# Patient Record
Sex: FEMALE | Race: WHITE | NOT HISPANIC OR LATINO | Employment: OTHER | URBAN - METROPOLITAN AREA
[De-identification: names, ages, dates, MRNs, and addresses within clinical notes are randomized per-mention and may not be internally consistent; named-entity substitution may affect disease eponyms.]

---

## 2020-06-19 ENCOUNTER — HOSPITAL ENCOUNTER (EMERGENCY)
Facility: CLINIC | Age: 80
Discharge: HOME | End: 2020-06-19
Attending: EMERGENCY MEDICINE | Admitting: EMERGENCY MEDICINE
Payer: COMMERCIAL

## 2020-06-19 VITALS
RESPIRATION RATE: 18 BRPM | SYSTOLIC BLOOD PRESSURE: 152 MMHG | BODY MASS INDEX: 27.49 KG/M2 | TEMPERATURE: 98.8 F | DIASTOLIC BLOOD PRESSURE: 105 MMHG | WEIGHT: 165 LBS | HEART RATE: 68 BPM | HEIGHT: 65 IN | OXYGEN SATURATION: 98 %

## 2020-06-19 DIAGNOSIS — L03.211 FACIAL CELLULITIS: ICD-10-CM

## 2020-06-19 DIAGNOSIS — L01.00 IMPETIGO: ICD-10-CM

## 2020-06-19 PROCEDURE — 25000132 ZZH RX MED GY IP 250 OP 250 PS 637: Performed by: EMERGENCY MEDICINE

## 2020-06-19 PROCEDURE — 99283 EMERGENCY DEPT VISIT LOW MDM: CPT | Performed by: EMERGENCY MEDICINE

## 2020-06-19 PROCEDURE — 99284 EMERGENCY DEPT VISIT MOD MDM: CPT | Mod: Z6 | Performed by: EMERGENCY MEDICINE

## 2020-06-19 RX ORDER — CEPHALEXIN 500 MG/1
500 CAPSULE ORAL 4 TIMES DAILY
Qty: 28 CAPSULE | Refills: 0 | Status: SHIPPED | OUTPATIENT
Start: 2020-06-19 | End: 2020-06-26

## 2020-06-19 RX ORDER — CEPHALEXIN 500 MG/1
CAPSULE ORAL
Status: DISPENSED
Start: 2020-06-19 | End: 2020-06-19

## 2020-06-19 RX ORDER — CEPHALEXIN 500 MG/1
500 CAPSULE ORAL ONCE
Status: COMPLETED | OUTPATIENT
Start: 2020-06-19 | End: 2020-06-19

## 2020-06-19 RX ADMIN — CEPHALEXIN 500 MG: 500 CAPSULE ORAL at 01:35

## 2020-06-19 ASSESSMENT — ENCOUNTER SYMPTOMS
CONFUSION: 0
SHORTNESS OF BREATH: 0
FACIAL SWELLING: 1
VOICE CHANGE: 0
CHEST TIGHTNESS: 0
ABDOMINAL PAIN: 0
HEADACHES: 0
COLOR CHANGE: 1
SORE THROAT: 0
FEVER: 0
NECK PAIN: 0
TROUBLE SWALLOWING: 0
NAUSEA: 0
FATIGUE: 0
BACK PAIN: 0
APPETITE CHANGE: 0

## 2020-06-19 ASSESSMENT — MIFFLIN-ST. JEOR: SCORE: 1224.32

## 2020-06-19 NOTE — ED PROVIDER NOTES
History     Chief Complaint   Patient presents with     Allergic Reaction     HPI  Claudene Lundberg is a 80 year old female with history of chronic kidney disease, hypercholesterolemia, and hypertension presenting for evaluation of facial swelling.  She reports some mild swelling of her upper lip over the past 2 days.  Patient does report having a small pimple on her left upper lip which seem to be draining a small amount of fluid as well.  She reports some slight increasing pain in the upper lip and has been applying warm compresses to help.  She does report swelling seem to be getting better yesterday but this morning woke up with sensation of increasing swelling and pain so came in for evaluation.  Denies fever chills.  Denies swelling in her mouth.  Denies difficulty swallowing or breathing.  No reported trauma however she does report she occasionally will shave small hairs that she gets on her upper lip and does report recently nicking her skin with the blade causing a small amount of bleeding.  No recent changes in medications.  No known allergies.  No previous similar symptoms in the past.    Allergies:  No Known Allergies    Problem List:    Patient Active Problem List    Diagnosis Date Noted     Chronic kidney disease, stage III (moderate) (H) 11/11/2009     Priority: Medium     Formatting of this note might be different from the original.  11/08 Creatinine 1.3  CREATININE                (MG/DL)   Date Value   11/11/09  1:50 PM 1.5*       Pure hypercholesterolemia 03/10/2008     Priority: Medium     3/08 Cholesterol 225 HDL 57  TG 90  TSH 1.26       Essential hypertension 03/07/2008     Priority: Medium        Past Medical History:    No past medical history on file.    Past Surgical History:    No past surgical history on file.    Family History:    No family history on file.    Social History:  Marital Status:   [2]  Social History     Tobacco Use     Smoking status: Not on file  "  Substance Use Topics     Alcohol use: Not on file     Drug use: Not on file        Medications:    cephALEXin (KEFLEX) 500 MG capsule          Review of Systems   Constitutional: Negative for appetite change, fatigue and fever.   HENT: Positive for facial swelling. Negative for congestion, sore throat, trouble swallowing and voice change.    Respiratory: Negative for chest tightness and shortness of breath.    Cardiovascular: Negative for chest pain.   Gastrointestinal: Negative for abdominal pain and nausea.   Musculoskeletal: Negative for back pain and neck pain.   Skin: Positive for color change (redness upper lip).   Neurological: Negative for headaches.   Psychiatric/Behavioral: Negative for confusion.   All other systems reviewed and are negative.      Physical Exam   Pulse: 76  Temp: 99.2  F (37.3  C)  Resp: 18  Height: 165.1 cm (5' 5\")  Weight: 74.8 kg (165 lb)  SpO2: 95 %      Physical Exam  Vitals signs and nursing note reviewed.   Constitutional:       Appearance: Normal appearance. She is not ill-appearing or diaphoretic.   HENT:      Head: Atraumatic.        Nose: Nose normal.      Mouth/Throat:      Lips: Pink.      Mouth: Mucous membranes are moist. No angioedema.      Dentition: Does not have dentures (Dentures not present). No dental abscesses.      Pharynx: No uvula swelling.      Comments: Moderate swelling and induration of the upper lip with asymmetry most prominent on the left upper lip, see photo below  Eyes:      Conjunctiva/sclera: Conjunctivae normal.   Neck:      Musculoskeletal: Normal range of motion and neck supple.   Cardiovascular:      Rate and Rhythm: Normal rate.   Pulmonary:      Effort: Pulmonary effort is normal.   Abdominal:      General: Abdomen is flat.   Musculoskeletal: Normal range of motion.   Skin:     General: Skin is warm and dry.      Capillary Refill: Capillary refill takes less than 2 seconds.   Neurological:      Mental Status: She is alert and oriented to " person, place, and time.   Psychiatric:         Mood and Affect: Mood normal.             ED Course        Procedures                   No results found for this or any previous visit (from the past 24 hour(s)).    Medications   cephALEXin (KEFLEX) capsule 500 mg (has no administration in time range)       Assessments & Plan (with Medical Decision Making)  80-year-old female presenting for evaluation of swelling of her left upper lip.  Symptoms began a few days ago when she noticed a small pimple on her left upper lip.  She is been applying warm compresses and had some drainage from the pimple.  Tonight woke up in the middle of the night with sensation of increasing swelling of the lip and when she looked in the mirror noticed more swelling so came in for evaluation.  On exam she has a mildly erythematous and warm indurated upper lip more prominent on the left than the right with an associated small lesion suggestive of impetigo.  The indurated area appears consistent with localized cellulitis.  No palpable fluctuance to suggest an abscess.  No other systemic symptoms to suggest an allergic reaction.  Recommended starting antibiotics for presumed impetigo with underlying facial cellulitis.  Prescribed cephalexin.  Recommended close monitoring for worsening with return precautions if any symptoms appear to get worse.     I have reviewed the nursing notes.    I have reviewed the findings, diagnosis, plan and need for follow up with the patient.       New Prescriptions    CEPHALEXIN (KEFLEX) 500 MG CAPSULE    Take 1 capsule (500 mg) by mouth 4 times daily for 7 days       Final diagnoses:   Facial cellulitis   Impetigo       6/19/2020   [facility] EMERGENCY DEPARTMENT     Benavides, Emmanuel Nolasco MD  06/19/20 0144

## 2020-06-19 NOTE — DISCHARGE INSTRUCTIONS
If your symptoms appear to be getting worse, please return to the emergency department for a recheck of your face

## 2020-06-19 NOTE — ED TRIAGE NOTES
Pt has had facial swelling since late weds and was getting better and now its worse again. Takes Lisinopril, atenolol and lasix no new medications.

## 2020-06-19 NOTE — ED AVS SNAPSHOT
[facility] Emergency Department  [address] [address]  Phone:  [phone]  Fax:  [phone]                                    Claudene Lundberg   MRN: [id number]    Department:  [facility] Emergency Department   Date of Visit:  6/19/2020           After Visit Summary Signature Page    I have received my discharge instructions, and my questions have been answered. I have discussed any challenges I see with this plan with the nurse or doctor.    ..........................................................................................................................................  Patient/Patient Representative Signature      ..........................................................................................................................................  Patient Representative Print Name and Relationship to Patient    ..................................................               ................................................  Date                                   Time    ..........................................................................................................................................  Reviewed by Signature/Title    ...................................................              ..............................................  Date                                               Time          22EPIC Rev 08/18

## 2021-06-02 ENCOUNTER — HOSPITAL ENCOUNTER (EMERGENCY)
Facility: CLINIC | Age: 81
Discharge: HOME | End: 2021-06-02
Attending: PHYSICIAN ASSISTANT | Admitting: PHYSICIAN ASSISTANT
Payer: COMMERCIAL

## 2021-06-02 VITALS
RESPIRATION RATE: 16 BRPM | WEIGHT: 160 LBS | DIASTOLIC BLOOD PRESSURE: 96 MMHG | SYSTOLIC BLOOD PRESSURE: 148 MMHG | OXYGEN SATURATION: 94 % | BODY MASS INDEX: 25.71 KG/M2 | HEIGHT: 66 IN | TEMPERATURE: 98.1 F | HEART RATE: 71 BPM
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
## 2021-06-02 DIAGNOSIS — R01.1 UNDIAGNOSED CARDIAC MURMURS: ICD-10-CM

## 2021-06-02 PROCEDURE — 99214 OFFICE O/P EST MOD 30 MIN: CPT | Performed by: PHYSICIAN ASSISTANT

## 2021-06-02 PROCEDURE — G0463 HOSPITAL OUTPT CLINIC VISIT: HCPCS | Performed by: PHYSICIAN ASSISTANT

## 2021-06-02 RX ORDER — DOXYCYCLINE HYCLATE 100 MG
100 TABLET ORAL 2 TIMES DAILY
Qty: 28 TABLET | Refills: 0 | Status: SHIPPED | OUTPATIENT
Start: 2021-06-02 | End: 2021-06-16

## 2021-06-02 RX ORDER — ATENOLOL 50 MG/1
50 TABLET ORAL DAILY
COMMUNITY
Start: 2023-02-21

## 2021-06-02 RX ORDER — LISINOPRIL 40 MG/1
40 TABLET ORAL DAILY
COMMUNITY
Start: 2021-04-14

## 2021-06-02 RX ORDER — AMLODIPINE BESYLATE 5 MG/1
5 TABLET ORAL
COMMUNITY
Start: 2021-01-20 | End: 2023-04-11

## 2021-06-02 RX ORDER — FUROSEMIDE 20 MG
30 TABLET ORAL DAILY
COMMUNITY
Start: 2021-03-18

## 2021-06-02 ASSESSMENT — ENCOUNTER SYMPTOMS
WOUND: 1
CARDIOVASCULAR NEGATIVE: 1
RESPIRATORY NEGATIVE: 1
NEUROLOGICAL NEGATIVE: 1
CONSTITUTIONAL NEGATIVE: 1
COLOR CHANGE: 0

## 2021-06-02 ASSESSMENT — MIFFLIN-ST. JEOR: SCORE: 1212.51

## 2021-06-03 NOTE — ED PROVIDER NOTES
History     Chief Complaint   Patient presents with     Insect Bite     c/o tick bite     HPI  Claudene Y Lundberg is a 81 year old female  with a past medical history of chronic kidney disease stage III, essential hypertension, and hypercholesterolemia who is presenting with a tick bite on her back, discovered this afternoon.  States that she discovered the tick this afternoon while taking a bath.  She has not been out in the yard or walking on the woods at any point time recently.  She brought the tick in a small pill bottle today.  The body of the insect looks white/engorged, but she does not know how long the tick was attached.  She feels as though a small portion head might still be stuck in her skin.  Has some redness surrounding the bite site.  She denies any current symptoms of Lyme disease such as fever, chills, nausea/vomiting, lightheadedness or dizziness, body aches, joint swelling or joint pain, or palpitations, chest pain or shortness of breath.    Allergies:  No Known Allergies    Problem List:    Patient Active Problem List    Diagnosis Date Noted     Chronic kidney disease, stage III (moderate) 11/11/2009     Priority: Medium     Formatting of this note might be different from the original.  11/08 Creatinine 1.3  CREATININE                (MG/DL)   Date Value   11/11/09  1:50 PM 1.5*       Pure hypercholesterolemia 03/10/2008     Priority: Medium     3/08 Cholesterol 225 HDL 57  TG 90  TSH 1.26       Essential hypertension 03/07/2008     Priority: Medium        Past Medical History:    History reviewed. No pertinent past medical history.    Past Surgical History:    History reviewed. No pertinent surgical history.    Family History:    History reviewed. No pertinent family history.    Social History:  Marital Status:   [2]  Social History     Tobacco Use     Smoking status: Never Smoker     Smokeless tobacco: Never Used   Substance Use Topics     Alcohol use: None     Drug use: None  "       Medications:    amLODIPine (NORVASC) 5 MG tablet  atenolol (TENORMIN) 50 MG tablet  doxycycline hyclate (VIBRA-TABS) 100 MG tablet  furosemide (LASIX) 20 MG tablet  lisinopril (ZESTRIL) 40 MG tablet          Review of Systems   Constitutional: Negative.    HENT: Negative.    Respiratory: Negative.    Cardiovascular: Negative.    Genitourinary: Negative.    Skin: Positive for rash and wound. Negative for color change and pallor.   Neurological: Negative.        Physical Exam   BP: (!) 185/95  Pulse: 71  Temp: 98.1  F (36.7  C)  Resp: 16  Height: 167.6 cm (5' 6\")  Weight: 72.6 kg (160 lb)  SpO2: 94 %      Physical Exam  Constitutional:       General: She is not in acute distress.     Appearance: Normal appearance. She is not ill-appearing.   HENT:      Head: Normocephalic and atraumatic.      Right Ear: No middle ear effusion. No mastoid tenderness. Tympanic membrane is not injected, perforated, erythematous, retracted or bulging.      Left Ear:  No middle ear effusion. No mastoid tenderness. Tympanic membrane is not injected, perforated, erythematous, retracted or bulging.   Eyes:      General:         Right eye: No discharge.         Left eye: No discharge.      Extraocular Movements: Extraocular movements intact.      Conjunctiva/sclera: Conjunctivae normal.   Neck:      Musculoskeletal: Neck supple. No neck rigidity or muscular tenderness.   Cardiovascular:      Rate and Rhythm: Normal rate and regular rhythm.      Pulses: Normal pulses.      Heart sounds: S1 normal and S2 normal. Heart sounds not distant. Murmur present. Crescendo  decrescendo  systolic murmur present with a grade of 2/6. No friction rub.      Comments: 2/6 systolic murmur noted at the right sternal border, 2nd intercostal space, pattern was crescendo followed by crescendo  Pulmonary:      Effort: Pulmonary effort is normal. No respiratory distress.      Breath sounds: Normal breath sounds. No stridor.   Lymphadenopathy:      Cervical: No " cervical adenopathy.      Right cervical: No superficial, deep or posterior cervical adenopathy.     Left cervical: No superficial, deep or posterior cervical adenopathy.   Skin:     General: Skin is warm and dry.      Findings: Lesion present.          Neurological:      General: No focal deficit present.      Mental Status: She is alert and oriented to person, place, and time.      Sensory: No sensory deficit.   Psychiatric:         Mood and Affect: Mood normal.         Behavior: Behavior normal.         Thought Content: Thought content normal.         Judgment: Judgment normal.         ED Course        Procedures               No results found for this or any previous visit (from the past 24 hour(s)).    Medications - No data to display    Assessments & Plan (with Medical Decision Making)     The patient is an 81-year-old female presented to urgent care with concern for tick bite on her low back, left side discovered this afternoon.  She brought the insect with her, which was engorged, body was white in appearance.  On inspection of the wound, it appeared there was a small retained portion of the tick head.    I cleansed the area with an alcohol prep pad and used a tweezers to remove the  retained foreign body.  Following removal, I am cleansed the area with povidone iodine, applied topical bacitracin and a Band-Aid.  Recommended applying bacitracin once per day for the next 5 to 7 days.    Given the engorged appearance of the tick, I am concerned about an extended attachment of 2 to 3 days.  I am starting the patient on a 2-week course of doxycycline given the likelihood of 3 days of attachment and a small amount of erythema at the site of the bite.  I feel this is reasonable given the patient's age even though she lacks any of the other symptoms of Lyme disease at this time.     Recommended follow-up with her primary care provider in 1 week for further evaluation of heart murmur as well as monitoring for  symptoms of Lyme disease.    Return to clinic for further evaluation if you develop fever, lightheadedness or dizziness, nausea/vomiting, body aches or joint pain, chest pain, difficulty breathing, shortness of breath, difficulty swallowing, or severe abdominal pain.      I have reviewed the nursing notes.    I have reviewed the findings, diagnosis, plan and need for follow up with the patient.      New Prescriptions    DOXYCYCLINE HYCLATE (VIBRA-TABS) 100 MG TABLET    Take 1 tablet (100 mg) by mouth 2 times daily for 14 days       Final diagnoses:   Tick bite   Undiagnosed cardiac murmurs       6/2/2021   [facility] EMERGENCY DEPT     Edgar Long PA-C  06/02/21 [id number]

## 2022-10-12 ENCOUNTER — APPOINTMENT (OUTPATIENT)
Dept: GENERAL RADIOLOGY | Facility: CLINIC | Age: 82
End: 2022-10-12
Attending: EMERGENCY MEDICINE
Payer: COMMERCIAL

## 2022-10-12 ENCOUNTER — APPOINTMENT (OUTPATIENT)
Dept: MRI IMAGING | Facility: CLINIC | Age: 82
End: 2022-10-12
Attending: EMERGENCY MEDICINE
Payer: COMMERCIAL

## 2022-10-12 ENCOUNTER — HOSPITAL ENCOUNTER (EMERGENCY)
Facility: CLINIC | Age: 82
Discharge: HOME | End: 2022-10-12
Attending: EMERGENCY MEDICINE | Admitting: EMERGENCY MEDICINE
Payer: COMMERCIAL

## 2022-10-12 VITALS
SYSTOLIC BLOOD PRESSURE: 153 MMHG | DIASTOLIC BLOOD PRESSURE: 105 MMHG | BODY MASS INDEX: 25.82 KG/M2 | RESPIRATION RATE: 18 BRPM | HEART RATE: 72 BPM | TEMPERATURE: 98.1 F | WEIGHT: 160 LBS

## 2022-10-12 DIAGNOSIS — M19.079 ARTHRITIS OF FIRST METATARSOPHALANGEAL (MTP) JOINT: ICD-10-CM

## 2022-10-12 DIAGNOSIS — M79.675 GREAT TOE PAIN, LEFT: ICD-10-CM

## 2022-10-12 LAB
ANION GAP SERPL CALCULATED.3IONS-SCNC: 11 MMOL/L (ref 7–15)
BASOPHILS # BLD MANUAL: 0 10E3/UL (ref 0–0.2)
BASOPHILS NFR BLD MANUAL: 0 %
BUN SERPL-MCNC: 20.5 MG/DL (ref 8–23)
CALCIUM SERPL-MCNC: 9.9 MG/DL (ref 8.8–10.2)
CHLORIDE SERPL-SCNC: 103 MMOL/L (ref 98–107)
CREAT SERPL-MCNC: 0.77 MG/DL (ref 0.51–0.95)
CRP SERPL-MCNC: 67.34 MG/L
DEPRECATED HCO3 PLAS-SCNC: 25 MMOL/L (ref 22–29)
EOSINOPHIL # BLD MANUAL: 0.1 10E3/UL (ref 0–0.7)
EOSINOPHIL NFR BLD MANUAL: 2 %
ERYTHROCYTE [DISTWIDTH] IN BLOOD BY AUTOMATED COUNT: 13.3 % (ref 10–15)
ERYTHROCYTE [SEDIMENTATION RATE] IN BLOOD BY WESTERGREN METHOD: 38 MM/HR (ref 0–30)
GFR SERPL CREATININE-BSD FRML MDRD: 77 ML/MIN/1.73M2
GLUCOSE SERPL-MCNC: 118 MG/DL (ref 70–99)
HCT VFR BLD AUTO: 40.6 % (ref 35–47)
HGB BLD-MCNC: 13.1 G/DL (ref 11.7–15.7)
LYMPHOCYTES # BLD MANUAL: 0.7 10E3/UL (ref 0.8–5.3)
LYMPHOCYTES NFR BLD MANUAL: 11 %
MCH RBC QN AUTO: 30.4 PG (ref 26.5–33)
MCHC RBC AUTO-ENTMCNC: 32.3 G/DL (ref 31.5–36.5)
MCV RBC AUTO: 94 FL (ref 78–100)
MONOCYTES # BLD MANUAL: 1.2 10E3/UL (ref 0–1.3)
MONOCYTES NFR BLD MANUAL: 19 %
NEUTROPHILS # BLD MANUAL: 4.4 10E3/UL (ref 1.6–8.3)
NEUTROPHILS NFR BLD MANUAL: 68 %
PLAT MORPH BLD: ABNORMAL
PLATELET # BLD AUTO: 207 10E3/UL (ref 150–450)
POTASSIUM SERPL-SCNC: 4.5 MMOL/L (ref 3.4–5.3)
RBC # BLD AUTO: 4.31 10E6/UL (ref 3.8–5.2)
RBC MORPH BLD: ABNORMAL
SODIUM SERPL-SCNC: 139 MMOL/L (ref 136–145)
URATE SERPL-MCNC: 6.5 MG/DL (ref 2.4–5.7)
WBC # BLD AUTO: 6.4 10E3/UL (ref 4–11)

## 2022-10-12 PROCEDURE — 99285 EMERGENCY DEPT VISIT HI MDM: CPT | Mod: 25 | Performed by: EMERGENCY MEDICINE

## 2022-10-12 PROCEDURE — 84550 ASSAY OF BLOOD/URIC ACID: CPT | Performed by: EMERGENCY MEDICINE

## 2022-10-12 PROCEDURE — 20604 DRAIN/INJ JOINT/BURSA W/US: CPT

## 2022-10-12 PROCEDURE — 20604 DRAIN/INJ JOINT/BURSA W/US: CPT | Performed by: EMERGENCY MEDICINE

## 2022-10-12 PROCEDURE — 80048 BASIC METABOLIC PNL TOTAL CA: CPT | Performed by: EMERGENCY MEDICINE

## 2022-10-12 PROCEDURE — 73720 MRI LWR EXTREMITY W/O&W/DYE: CPT | Mod: 26 | Performed by: RADIOLOGY

## 2022-10-12 PROCEDURE — 85027 COMPLETE CBC AUTOMATED: CPT | Performed by: EMERGENCY MEDICINE

## 2022-10-12 PROCEDURE — 255N000002 HC RX 255 OP 636: Performed by: EMERGENCY MEDICINE

## 2022-10-12 PROCEDURE — 85007 BL SMEAR W/DIFF WBC COUNT: CPT | Performed by: EMERGENCY MEDICINE

## 2022-10-12 PROCEDURE — 29515 APPLICATION SHORT LEG SPLINT: CPT | Mod: LT | Performed by: EMERGENCY MEDICINE

## 2022-10-12 PROCEDURE — 73720 MRI LWR EXTREMITY W/O&W/DYE: CPT | Mod: LT

## 2022-10-12 PROCEDURE — A9585 GADOBUTROL INJECTION: HCPCS | Performed by: EMERGENCY MEDICINE

## 2022-10-12 PROCEDURE — 85652 RBC SED RATE AUTOMATED: CPT | Performed by: EMERGENCY MEDICINE

## 2022-10-12 PROCEDURE — 36415 COLL VENOUS BLD VENIPUNCTURE: CPT | Performed by: EMERGENCY MEDICINE

## 2022-10-12 PROCEDURE — 73660 X-RAY EXAM OF TOE(S): CPT | Mod: LT

## 2022-10-12 PROCEDURE — 250N000013 HC RX MED GY IP 250 OP 250 PS 637: Performed by: EMERGENCY MEDICINE

## 2022-10-12 PROCEDURE — 86140 C-REACTIVE PROTEIN: CPT | Performed by: EMERGENCY MEDICINE

## 2022-10-12 RX ORDER — LORAZEPAM 0.5 MG/1
0.5 TABLET ORAL ONCE
Status: DISCONTINUED | OUTPATIENT
Start: 2022-10-12 | End: 2022-10-12 | Stop reason: HOSPADM

## 2022-10-12 RX ORDER — METHYLPREDNISOLONE 4 MG
TABLET, DOSE PACK ORAL
Qty: 21 TABLET | Refills: 0 | Status: SHIPPED | OUTPATIENT
Start: 2022-10-12 | End: 2023-04-11

## 2022-10-12 RX ORDER — OXYCODONE HYDROCHLORIDE 5 MG/1
5 TABLET ORAL ONCE
Status: COMPLETED | OUTPATIENT
Start: 2022-10-12 | End: 2022-10-12

## 2022-10-12 RX ORDER — GADOBUTROL 604.72 MG/ML
7 INJECTION INTRAVENOUS ONCE
Status: COMPLETED | OUTPATIENT
Start: 2022-10-12 | End: 2022-10-12

## 2022-10-12 RX ADMIN — OXYCODONE HYDROCHLORIDE 5 MG: 5 TABLET ORAL at 03:26

## 2022-10-12 RX ADMIN — GADOBUTROL 7 ML: 604.72 INJECTION INTRAVENOUS at 09:48

## 2022-10-12 ASSESSMENT — ACTIVITIES OF DAILY LIVING (ADL)
ADLS_ACUITY_SCORE: 35

## 2022-10-12 ASSESSMENT — ENCOUNTER SYMPTOMS
VOMITING: 0
FEVER: 0
JOINT SWELLING: 1
WEAKNESS: 0

## 2022-10-12 NOTE — ED PROVIDER NOTES
History     Chief Complaint   Patient presents with     Foot Pain     HPI  Claudene Y Lundberg is a 82 year old female who presents for left foot pain.  Symptoms getting worse over the past 2 or 3 days.  No known injury.  Pain is throbbing and aching, hurts to move the toe and to put weight on the foot.  She is tried acetaminophen without improvement.  No fevers, chills, nausea, vomiting, body aches, abdominal pain.  She has never had this before.    Allergies:  No Known Allergies    Problem List:    Patient Active Problem List    Diagnosis Date Noted     Chronic kidney disease, stage III (moderate) (H) 11/11/2009     Priority: Medium     Formatting of this note might be different from the original.  11/08 Creatinine 1.3  CREATININE                (MG/DL)   Date Value   11/11/09  1:50 PM 1.5*       Pure hypercholesterolemia 03/10/2008     Priority: Medium     3/08 Cholesterol 225 HDL 57  TG 90  TSH 1.26       Essential hypertension 03/07/2008     Priority: Medium        Past Medical History:    No past medical history on file.    Past Surgical History:    No past surgical history on file.    Family History:    No family history on file.    Social History:  Marital Status:   [2]  Social History     Tobacco Use     Smoking status: Never     Smokeless tobacco: Never        Medications:    amLODIPine (NORVASC) 5 MG tablet  atenolol (TENORMIN) 50 MG tablet  furosemide (LASIX) 20 MG tablet  lisinopril (ZESTRIL) 40 MG tablet          Review of Systems   Constitutional: Negative for fever.   Gastrointestinal: Negative for vomiting.   Musculoskeletal: Positive for joint swelling (left great toe).   Neurological: Negative for weakness.       Physical Exam   BP: (!) 166/97  Pulse: 69  Temp: 98.1  F (36.7  C)  Resp: 18  Weight: 72.6 kg (160 lb)      Physical Exam  Constitutional:       General: She is not in acute distress.     Appearance: She is well-developed and well-nourished. She is not diaphoretic.   HENT:       Head: Normocephalic and atraumatic.   Eyes:      General: No scleral icterus.  Musculoskeletal:      Cervical back: Normal range of motion and neck supple.      Comments: Left foot: Erythema, excess warmth, swelling, tenderness over the top of the left foot maximally over the MTP joint of the great toe.  Pain elicited with movement of the MTP joint of the great toe.   Skin:     General: Skin is warm and dry.      Coloration: Skin is not pale.   Neurological:      Mental Status: She is alert and oriented to person, place, and time.         ED Course                 Procedures    Results for orders placed during the hospital encounter of 10/12/22    POC US GUIDANCE NEEDLE PLACEMENT    Impression  [facility] Procedure Note    Limited Bedside ED Ultrasound for Procedural Guidance:    Procedure: Arthrocentesis  PROCEDURE: PERFORMED BY: Dr. Juan Lala MD  INDICATIONS/SYMPTOM:  Swelling in joint  PROBE: High frequency linear probe  BODY LOCATION: Left great toe  FINDINGS:  Normal landmarks were identified such that vessels (arteries, veins) and nerves were avoided.  INTERPRETATION:  Stuctures were visualized but aspiration was not successful.  IMAGE DOCUMENTATION: Images were archived to PACs system.            Critical Care time:  none               Results for orders placed or performed during the hospital encounter of 10/12/22 (from the past 24 hour(s))   POC US GUIDANCE NEEDLE PLACEMENT    Impression    [facility] Procedure Note      Limited Bedside ED Ultrasound for Procedural Guidance:    Procedure: Arthrocentesis  PROCEDURE: PERFORMED BY: Dr. Juan Lala MD  INDICATIONS/SYMPTOM:  Swelling in joint  PROBE: High frequency linear probe  BODY LOCATION: Left great toe  FINDINGS:  Normal landmarks were identified such that vessels (arteries, veins) and nerves were avoided.  INTERPRETATION:  Stuctures were visualized but aspiration was not successful.  IMAGE DOCUMENTATION: Images  were archived to PACs system.    XR Toe Left G/E 2 Views    Narrative    EXAM: XR TOE LEFT G/E 2 VIEWS  LOCATION: [facility]  DATE/TIME: 10/12/2022 4:05 AM    INDICATION: pain and swelling over mtp joint  COMPARISON: None.      Impression    IMPRESSION: Lucency and possible cortical irregularity about the distal first left metatarsal, which could be seen with osteomyelitis in the right clinical context. There is adjacent soft tissue swelling. If clinical concern for osteomyelitis, could   consider MRI for further evaluation. There is some degenerative change also noted at the 1st metatarsal phalangeal joint.    Basic metabolic panel   Result Value Ref Range    Sodium 139 136 - 145 mmol/L    Potassium 4.5 3.4 - 5.3 mmol/L    Chloride 103 98 - 107 mmol/L    Carbon Dioxide (CO2) 25 22 - 29 mmol/L    Anion Gap 11 7 - 15 mmol/L    Urea Nitrogen 20.5 8.0 - 23.0 mg/dL    Creatinine 0.77 0.51 - 0.95 mg/dL    Calcium 9.9 8.8 - 10.2 mg/dL    Glucose 118 (H) 70 - 99 mg/dL    GFR Estimate 77 >60 mL/min/1.73m2   CBC with Platelets & Differential    Narrative    The following orders were created for panel order CBC with Platelets & Differential.  Procedure                               Abnormality         Status                     ---------                               -----------         ------                     CBC with platelets and d...[[id number]]  Normal              Final result               Manual Differential[[id number]]          Abnormal            Final result                 Please view results for these tests on the individual orders.   CRP inflammation   Result Value Ref Range    CRP Inflammation 67.34 (H) <5.00 mg/L   Erythrocyte sedimentation rate auto   Result Value Ref Range    Erythrocyte Sedimentation Rate 38 (H) 0 - 30 mm/hr   CBC with platelets and differential   Result Value Ref Range    WBC Count 6.4 4.0 - 11.0 10e3/uL    RBC Count 4.31 3.80 - 5.20 10e6/uL    Hemoglobin 13.1  11.7 - 15.7 g/dL    Hematocrit 40.6 35.0 - 47.0 %    MCV 94 78 - 100 fL    MCH 30.4 26.5 - 33.0 pg    MCHC 32.3 31.5 - 36.5 g/dL    RDW 13.3 10.0 - 15.0 %    Platelet Count 207 150 - 450 10e3/uL   Manual Differential   Result Value Ref Range    % Neutrophils 68 %    % Lymphocytes 11 %    % Monocytes 19 %    % Eosinophils 2 %    % Basophils 0 %    Absolute Neutrophils 4.4 1.6 - 8.3 10e3/uL    Absolute Lymphocytes 0.7 (L) 0.8 - 5.3 10e3/uL    Absolute Monocytes 1.2 0.0 - 1.3 10e3/uL    Absolute Eosinophils 0.1 0.0 - 0.7 10e3/uL    Absolute Basophils 0.0 0.0 - 0.2 10e3/uL    RBC Morphology Confirmed RBC Indices     Platelet Assessment  Automated Count Confirmed. Platelet morphology is normal.     Automated Count Confirmed. Platelet morphology is normal.       Medications   LORazepam (ATIVAN) tablet 0.5 mg (has no administration in time range)   oxyCODONE (ROXICODONE) tablet 5 mg (5 mg Oral Given 10/12/22 0326)       Assessments & Plan (with Medical Decision Making)   82-year-old female presents with pain and swelling of the left foot.  Temperature is 36.7  C, heart rate is 67, blood pressure is 145/84.  No signs of systemic infection.  She is given oxycodone for the pain.  I attempted aspiration but this was unfortunately not successful.  X-ray of the foot is obtained, images reviewed independently as well as radiology read reviewed, no fracture but they do note some lucency that is concerning for possible osteomyelitis.  I think this is low risk, however with the patient's pain, erythema, tenderness, excess warmth, it certainly is a possibility.  Therefore blood is drawn and she does have elevations of her CRP and ESR but a normal white blood cell count.  Given the clinical uncertainty I will obtain an MRI of her foot to look for signs of osteomyelitis.  If this was reassuring I think that she could go home with pain control and a postoperative boot and symptomatic management for likely gout with return precautions  for possible septic arthritis.  The patient is signed out to Dr. Brenner at change of shift to follow-up on this.    I have reviewed the nursing notes.    I have reviewed the findings, diagnosis, plan and need for follow up with the patient.       New Prescriptions    No medications on file       Final diagnoses:   Great toe pain, left       10/12/2022   [facility] EMERGENCY DEPT     Juan Lala MD  10/12/22 0697

## 2022-10-12 NOTE — ED PROVIDER NOTES
Emergency Department Patient Sign-out       Brief HPI:  This is a 82 year old female signed out to me by Dr. Lala at 7:04 AM, at the end of his shift..  See initial ED Provider note for details of the presentation.     Significant Events prior to my assuming care: Laboratory evaluation completed and plain films were performed which showed a possible first metatarsal cortical irregularity concerning for possible osteomyelitis.  An MRI has been ordered but not yet performed.      Exam:   Patient Vitals for the past 24 hrs:   BP Temp Temp src Pulse Resp Weight   10/12/22 0430 (!) 153/105 -- -- 72 -- --   10/12/22 0400 (!) 154/94 -- -- 67 -- --   10/12/22 0330 (!) 157/86 -- -- 67 -- --   10/12/22 0300 (!) 145/84 -- -- 67 -- --   10/12/22 0243 (!) 166/97 98.1  F (36.7  C) Oral 69 18 72.6 kg (160 lb)           ED RESULTS:   Results for orders placed or performed during the hospital encounter of 10/12/22 (from the past 24 hour(s))   POC US GUIDANCE NEEDLE PLACEMENT     Status: None    Collection Time: 10/12/22  3:16 AM    [facility] Procedure Note      Limited Bedside ED Ultrasound for Procedural Guidance:    Procedure: Arthrocentesis  PROCEDURE: PERFORMED BY: Dr. Juan Lala MD  INDICATIONS/SYMPTOM:  Swelling in joint  PROBE: High frequency linear probe  BODY LOCATION: Left great toe  FINDINGS:  Normal landmarks were identified such that vessels (arteries, veins) and nerves were avoided.  INTERPRETATION:  Stuctures were visualized but aspiration was not successful.  IMAGE DOCUMENTATION: Images were archived to PACs system.    XR Toe Left G/E 2 Views     Status: None    Collection Time: 10/12/22  4:05 AM    Narrative    EXAM: XR TOE LEFT G/E 2 VIEWS  LOCATION: [facility]  DATE/TIME: 10/12/2022 4:05 AM    INDICATION: pain and swelling over mtp joint  COMPARISON: None.      Impression    IMPRESSION: Lucency and possible cortical irregularity about the distal  first left metatarsal, which could be seen with osteomyelitis in the right clinical context. There is adjacent soft tissue swelling. If clinical concern for osteomyelitis, could   consider MRI for further evaluation. There is some degenerative change also noted at the 1st metatarsal phalangeal joint.    Basic metabolic panel     Status: Abnormal    Collection Time: 10/12/22  4:54 AM   Result Value Ref Range    Sodium 139 136 - 145 mmol/L    Potassium 4.5 3.4 - 5.3 mmol/L    Chloride 103 98 - 107 mmol/L    Carbon Dioxide (CO2) 25 22 - 29 mmol/L    Anion Gap 11 7 - 15 mmol/L    Urea Nitrogen 20.5 8.0 - 23.0 mg/dL    Creatinine 0.77 0.51 - 0.95 mg/dL    Calcium 9.9 8.8 - 10.2 mg/dL    Glucose 118 (H) 70 - 99 mg/dL    GFR Estimate 77 >60 mL/min/1.73m2   CBC with Platelets & Differential     Status: Abnormal    Collection Time: 10/12/22  4:54 AM    Narrative    The following orders were created for panel order CBC with Platelets & Differential.  Procedure                               Abnormality         Status                     ---------                               -----------         ------                     CBC with platelets and d...[[id number]]  Normal              Final result               Manual Differential[[id number]]          Abnormal            Final result                 Please view results for these tests on the individual orders.   CRP inflammation     Status: Abnormal    Collection Time: 10/12/22  4:54 AM   Result Value Ref Range    CRP Inflammation 67.34 (H) <5.00 mg/L   Erythrocyte sedimentation rate auto     Status: Abnormal    Collection Time: 10/12/22  4:54 AM   Result Value Ref Range    Erythrocyte Sedimentation Rate 38 (H) 0 - 30 mm/hr   CBC with platelets and differential     Status: Normal    Collection Time: 10/12/22  4:54 AM   Result Value Ref Range    WBC Count 6.4 4.0 - 11.0 10e3/uL    RBC Count 4.31 3.80 - 5.20 10e6/uL    Hemoglobin 13.1 11.7 - 15.7 g/dL    Hematocrit 40.6 35.0 - 47.0 %     MCV 94 78 - 100 fL    MCH 30.4 26.5 - 33.0 pg    MCHC 32.3 31.5 - 36.5 g/dL    RDW 13.3 10.0 - 15.0 %    Platelet Count 207 150 - 450 10e3/uL   Manual Differential     Status: Abnormal    Collection Time: 10/12/22  4:54 AM   Result Value Ref Range    % Neutrophils 68 %    % Lymphocytes 11 %    % Monocytes 19 %    % Eosinophils 2 %    % Basophils 0 %    Absolute Neutrophils 4.4 1.6 - 8.3 10e3/uL    Absolute Lymphocytes 0.7 (L) 0.8 - 5.3 10e3/uL    Absolute Monocytes 1.2 0.0 - 1.3 10e3/uL    Absolute Eosinophils 0.1 0.0 - 0.7 10e3/uL    Absolute Basophils 0.0 0.0 - 0.2 10e3/uL    RBC Morphology Confirmed RBC Indices     Platelet Assessment  Automated Count Confirmed. Platelet morphology is normal.     Automated Count Confirmed. Platelet morphology is normal.   Uric acid     Status: Abnormal    Collection Time: 10/12/22  4:54 AM   Result Value Ref Range    Uric Acid 6.5 (H) 2.4 - 5.7 mg/dL   MR Foot Left w/o & w Contrast     Status: None    Collection Time: 10/12/22  9:53 AM    Narrative    MR left foot without and with contrast 10/12/2022 10:12 AM    History: pain and swelling of great toe, concern for osteomyelitis    Techniques: Multiplanar multisequence imaging of the left foot was  obtained before and after administration of intravenous contrast.    Contrast: 7 ML GADAVIST    Comparison: Radiograph from the same day.    Findings:    Motion partially degrading images.    Apparent plantar soft tissue defect/loss at the level of the great toe  proximal phalanx with relative loss of signal/susceptibility artifact  partially compromising assessment of the area. Lesional subcutaneous  fat effacement with edema signal without rim-enhancing fluid  collection to indicate abscess formation.    Moderate to large first metatarsophalangeal joint effusion containing  internal debris without discrete sinus tract communication to the area  of apparent soft tissue defect/loss.    There is underlying erosion/T1 hypointensity of  the first metatarsal  head and also tibial aspect and also plantar aspect at the metatarsal  neck.     Complete joint space loss with degenerative change at the first  metatarsophalangeal joint.    Bones    Edema like marrow signal intensity at the first proximal phalangeal  head with associated mild T1 hypointensity without soft tissue defect  or substantial joint effusion.    Degenerative changes at the tarsometatarsal joints especially second  and third, as well as talonavicular joint, may be related to Charcot  arthropathy.    Joints and periarticular soft tissue    Joint effusion: Moderate to large first metatarsophalangeal joint  effusion. Small second and third metatarsophalangeal joint effusion.  Moderate fluid lateral to the calcaneocuboid articulation.    Plantar plates: Intersesamoidal ligament and sesamoidal phalangeal  ligaments of the first metatarsophalangeal joints are intact. Plantar  plates of the second through fifth toe at metatarsophalangeal joints  are grossly intact.    Intermetatarsal spaces: Moderate first, small third intermetatarsal  bursal fluid. Pericapsular fat effacement particularly around the  second and third metatarsal heads, likely related to pericapsular  fibrosis.    Ligaments and Tendons    Lisfranc interosseous ligament: Intact.    Tendons: The visualized courses of flexor and extensor tendons are  intact.     Muscles    Diffuse muscle edema, likely related to  microangiopathy/polyneuropathy.    ANCILLARY FINDINGS    Extensive dorsal soft tissue swelling and edema. Question foci of  susceptibility artifact dorsal to the talar neck body junction (image  18 and series 11 for instance), incompletely assessed.      Impression    Impression:  1. Moderate to large first MTP joint effusion with first metatarsal  head/erosion/marrow abnormality. In an appropriate clinical setting  this may be consistent with septic arthritis with osteomyelitis.   a. Regionally, apparent plantar soft  tissue defect/loss at the level  of the great toe proximal phalanx, if this is indeed area of ulcer,  despite lack of clear communication to the joint, infection becomes  high in differential. If there is no open ulcer, other inflammatory  arthritis as well as crystal deposition disease like gout are in  differential. Please correlate clinically especially given the  relative loss of signal/artifact partially compromising assessment of  the area.   2. No abscess in the area of plantar soft tissue loss/defect.  3. First proximal phalangeal head edema, nonspecific and may be   related to degenerative change though infectious etiology cannot be  entirely excluded given vicinity to apparent soft tissue defect.  4. Question foci of susceptibility artifact dorsal to the talar neck  body junction (image 18 and series 11 for instance), incompletely  assessed. This area was not included on the same date radiograph.  Differential consideration include soft tissue gas as well as  paramagnetic foreign bodies and may be from prior surgery/intervention  if such history exist. Please correlate clinically and if indicated  ankle radiographs, and possibly CT may be needed.    PRATIK MIGUEL         SYSTEM ID:  [id number]       ED MEDICATIONS:   Medications   LORazepam (ATIVAN) tablet 0.5 mg (0.5 mg Oral Not Given 10/12/22 0953)   oxyCODONE (ROXICODONE) tablet 5 mg (5 mg Oral Given 10/12/22 0326)   gadobutrol (GADAVIST) injection 7 mL (7 mLs Intravenous Given 10/12/22 0948)         Impression:    ICD-10-CM    1. Great toe pain, left  M79.675 Ankle/Foot Bracing Supplies Order      2. Arthritis of first metatarsophalangeal (MTP) joint  M19.079 Orthopedic  Referral    Left foot, probable gout.          Plan:    Pending studies: MRI of the great toe/foot.  In addition I will add a uric acid level to her laboratory work-up.    9:33 AM - She is having her MRI performed.    I reviewed the MRI results with the patient and her son.   I will consult podiatry.    11:28 AM - Reviewed the case and consulted with Dr. Self, Podiatry.  He reviewed the patient's x-rays and felt that they did not appear to indicate osteomyelitis.  I informed him of the MRI evaluation.  Clinically she appears to have left great toe MTP joint arthritis consistent with gout.  Follow-up MRI evaluation shows the arthritis in the joint, but other findings which are not felt to be clinically relevant.  We discussed her treatment and disposition plan and are in agreement with this: Probable gout, initiate steroid therapy with a Medrol Dosepak, and I will have her follow-up in Podiatry clinic and her primary care clinic. I will review this with the patient and her son.    Patient and her son are comfortable with today's evaluation and disposition plan with presumed gout in the left great toe MTP joint causing her symptoms.  She was placed in a postop orthopedic shoe for support and comfort, will use a cane as needed and initiate therapy with a Medrol Dosepak.  I made an orthopedic  referral for Podiatry clinic follow-up and she will contact her primary care clinic for follow-up as well.     Jorge Brenner MD  10/12/22 3099

## 2023-04-11 ENCOUNTER — APPOINTMENT (OUTPATIENT)
Dept: GENERAL RADIOLOGY | Facility: CLINIC | Age: 83
DRG: 193 | End: 2023-04-11
Attending: EMERGENCY MEDICINE
Payer: COMMERCIAL

## 2023-04-11 ENCOUNTER — HOSPITAL ENCOUNTER (INPATIENT)
Facility: CLINIC | Age: 83
LOS: 2 days | Discharge: HOME | DRG: 193 | End: 2023-04-14
Attending: EMERGENCY MEDICINE | Admitting: INTERNAL MEDICINE
Payer: COMMERCIAL

## 2023-04-11 DIAGNOSIS — R05.9 COUGH, UNSPECIFIED TYPE: ICD-10-CM

## 2023-04-11 DIAGNOSIS — J18.9 PNEUMONIA OF LOWER LOBE DUE TO INFECTIOUS ORGANISM, UNSPECIFIED LATERALITY: ICD-10-CM

## 2023-04-11 DIAGNOSIS — I10 ESSENTIAL HYPERTENSION: Primary | ICD-10-CM

## 2023-04-11 LAB
ALBUMIN SERPL BCG-MCNC: 4.2 G/DL (ref 3.5–5.2)
ALP SERPL-CCNC: 51 U/L (ref 35–104)
ALT SERPL W P-5'-P-CCNC: 13 U/L (ref 10–35)
ANION GAP SERPL CALCULATED.3IONS-SCNC: 12 MMOL/L (ref 7–15)
AST SERPL W P-5'-P-CCNC: 21 U/L (ref 10–35)
BASOPHILS # BLD MANUAL: 0 10E3/UL (ref 0–0.2)
BASOPHILS NFR BLD MANUAL: 0 %
BILIRUB SERPL-MCNC: 0.6 MG/DL
BUN SERPL-MCNC: 28.2 MG/DL (ref 8–23)
CALCIUM SERPL-MCNC: 9.5 MG/DL (ref 8.8–10.2)
CHLORIDE SERPL-SCNC: 103 MMOL/L (ref 98–107)
CREAT SERPL-MCNC: 1.01 MG/DL (ref 0.51–0.95)
DEPRECATED HCO3 PLAS-SCNC: 25 MMOL/L (ref 22–29)
EOSINOPHIL # BLD MANUAL: 0 10E3/UL (ref 0–0.7)
EOSINOPHIL NFR BLD MANUAL: 0 %
ERYTHROCYTE [DISTWIDTH] IN BLOOD BY AUTOMATED COUNT: 13.6 % (ref 10–15)
FLUAV RNA SPEC QL NAA+PROBE: NEGATIVE
FLUBV RNA RESP QL NAA+PROBE: NEGATIVE
GFR SERPL CREATININE-BSD FRML MDRD: 55 ML/MIN/1.73M2
GLUCOSE SERPL-MCNC: 126 MG/DL (ref 70–99)
HCT VFR BLD AUTO: 42.1 % (ref 35–47)
HGB BLD-MCNC: 13.5 G/DL (ref 11.7–15.7)
LIPASE SERPL-CCNC: 39 U/L (ref 13–60)
LYMPHOCYTES # BLD MANUAL: 0.6 10E3/UL (ref 0.8–5.3)
LYMPHOCYTES NFR BLD MANUAL: 9 %
MCH RBC QN AUTO: 31.1 PG (ref 26.5–33)
MCHC RBC AUTO-ENTMCNC: 32.1 G/DL (ref 31.5–36.5)
MCV RBC AUTO: 97 FL (ref 78–100)
MONOCYTES # BLD MANUAL: 1.1 10E3/UL (ref 0–1.3)
MONOCYTES NFR BLD MANUAL: 18 %
NEUTROPHILS # BLD MANUAL: 4.6 10E3/UL (ref 1.6–8.3)
NEUTROPHILS NFR BLD MANUAL: 73 %
NT-PROBNP SERPL-MCNC: 286 PG/ML (ref 0–1800)
PLAT MORPH BLD: ABNORMAL
PLATELET # BLD AUTO: 139 10E3/UL (ref 150–450)
POTASSIUM SERPL-SCNC: 4.3 MMOL/L (ref 3.4–5.3)
PROT SERPL-MCNC: 6.9 G/DL (ref 6.4–8.3)
RBC # BLD AUTO: 4.34 10E6/UL (ref 3.8–5.2)
RBC MORPH BLD: ABNORMAL
RSV RNA SPEC NAA+PROBE: NEGATIVE
SARS-COV-2 RNA RESP QL NAA+PROBE: NEGATIVE
SODIUM SERPL-SCNC: 140 MMOL/L (ref 136–145)
TROPONIN T SERPL HS-MCNC: 12 NG/L
TROPONIN T SERPL HS-MCNC: 14 NG/L
WBC # BLD AUTO: 6.3 10E3/UL (ref 4–11)

## 2023-04-11 PROCEDURE — 99285 EMERGENCY DEPT VISIT HI MDM: CPT | Mod: 25 | Performed by: EMERGENCY MEDICINE

## 2023-04-11 PROCEDURE — 99223 1ST HOSP IP/OBS HIGH 75: CPT | Mod: AI | Performed by: INTERNAL MEDICINE

## 2023-04-11 PROCEDURE — 258N000003 HC RX IP 258 OP 636

## 2023-04-11 PROCEDURE — 85007 BL SMEAR W/DIFF WBC COUNT: CPT | Performed by: EMERGENCY MEDICINE

## 2023-04-11 PROCEDURE — 250N000011 HC RX IP 250 OP 636

## 2023-04-11 PROCEDURE — 94640 AIRWAY INHALATION TREATMENT: CPT

## 2023-04-11 PROCEDURE — C9803 HOPD COVID-19 SPEC COLLECT: HCPCS

## 2023-04-11 PROCEDURE — 93005 ELECTROCARDIOGRAM TRACING: CPT

## 2023-04-11 PROCEDURE — 96375 TX/PRO/DX INJ NEW DRUG ADDON: CPT

## 2023-04-11 PROCEDURE — 36415 COLL VENOUS BLD VENIPUNCTURE: CPT | Performed by: EMERGENCY MEDICINE

## 2023-04-11 PROCEDURE — 99207 PR APP CREDIT; MD BILLING SHARED VISIT: CPT

## 2023-04-11 PROCEDURE — 93010 ELECTROCARDIOGRAM REPORT: CPT | Performed by: EMERGENCY MEDICINE

## 2023-04-11 PROCEDURE — 84484 ASSAY OF TROPONIN QUANT: CPT | Performed by: EMERGENCY MEDICINE

## 2023-04-11 PROCEDURE — 250N000013 HC RX MED GY IP 250 OP 250 PS 637

## 2023-04-11 PROCEDURE — 80053 COMPREHEN METABOLIC PANEL: CPT | Performed by: EMERGENCY MEDICINE

## 2023-04-11 PROCEDURE — 85027 COMPLETE CBC AUTOMATED: CPT | Performed by: EMERGENCY MEDICINE

## 2023-04-11 PROCEDURE — 250N000009 HC RX 250: Performed by: EMERGENCY MEDICINE

## 2023-04-11 PROCEDURE — 250N000011 HC RX IP 250 OP 636: Performed by: EMERGENCY MEDICINE

## 2023-04-11 PROCEDURE — 82310 ASSAY OF CALCIUM: CPT | Performed by: EMERGENCY MEDICINE

## 2023-04-11 PROCEDURE — 96376 TX/PRO/DX INJ SAME DRUG ADON: CPT

## 2023-04-11 PROCEDURE — 83880 ASSAY OF NATRIURETIC PEPTIDE: CPT | Performed by: EMERGENCY MEDICINE

## 2023-04-11 PROCEDURE — 71046 X-RAY EXAM CHEST 2 VIEWS: CPT

## 2023-04-11 PROCEDURE — 258N000003 HC RX IP 258 OP 636: Performed by: EMERGENCY MEDICINE

## 2023-04-11 PROCEDURE — G0378 HOSPITAL OBSERVATION PER HR: HCPCS

## 2023-04-11 PROCEDURE — 99285 EMERGENCY DEPT VISIT HI MDM: CPT | Mod: 25,CS

## 2023-04-11 PROCEDURE — 83690 ASSAY OF LIPASE: CPT | Performed by: EMERGENCY MEDICINE

## 2023-04-11 PROCEDURE — 87637 SARSCOV2&INF A&B&RSV AMP PRB: CPT | Performed by: EMERGENCY MEDICINE

## 2023-04-11 PROCEDURE — 96365 THER/PROPH/DIAG IV INF INIT: CPT

## 2023-04-11 RX ORDER — POLYETHYLENE GLYCOL 3350 17 G/17G
17 POWDER, FOR SOLUTION ORAL DAILY PRN
Status: DISCONTINUED | OUTPATIENT
Start: 2023-04-11 | End: 2023-04-14 | Stop reason: HOSPADM

## 2023-04-11 RX ORDER — IPRATROPIUM BROMIDE AND ALBUTEROL SULFATE 2.5; .5 MG/3ML; MG/3ML
3 SOLUTION RESPIRATORY (INHALATION) ONCE
Status: COMPLETED | OUTPATIENT
Start: 2023-04-11 | End: 2023-04-11

## 2023-04-11 RX ORDER — ACETAMINOPHEN 325 MG/1
650 TABLET ORAL EVERY 4 HOURS PRN
Status: DISCONTINUED | OUTPATIENT
Start: 2023-04-11 | End: 2023-04-14 | Stop reason: HOSPADM

## 2023-04-11 RX ORDER — ACETAMINOPHEN 325 MG/1
650 TABLET ORAL EVERY 4 HOURS PRN
Status: DISCONTINUED | OUTPATIENT
Start: 2023-04-11 | End: 2023-04-11

## 2023-04-11 RX ORDER — LISINOPRIL 40 MG/1
40 TABLET ORAL DAILY
Status: DISCONTINUED | OUTPATIENT
Start: 2023-04-11 | End: 2023-04-14 | Stop reason: HOSPADM

## 2023-04-11 RX ORDER — CEFTRIAXONE 2 G/1
2 INJECTION, POWDER, FOR SOLUTION INTRAMUSCULAR; INTRAVENOUS ONCE
Status: COMPLETED | OUTPATIENT
Start: 2023-04-12 | End: 2023-04-12

## 2023-04-11 RX ORDER — CEFTRIAXONE 1 G/1
1 INJECTION, POWDER, FOR SOLUTION INTRAMUSCULAR; INTRAVENOUS ONCE
Status: COMPLETED | OUTPATIENT
Start: 2023-04-11 | End: 2023-04-11

## 2023-04-11 RX ORDER — ONDANSETRON 2 MG/ML
4 INJECTION INTRAMUSCULAR; INTRAVENOUS EVERY 6 HOURS PRN
Status: DISCONTINUED | OUTPATIENT
Start: 2023-04-11 | End: 2023-04-14 | Stop reason: HOSPADM

## 2023-04-11 RX ORDER — SODIUM CHLORIDE 9 MG/ML
INJECTION, SOLUTION INTRAVENOUS CONTINUOUS
Status: DISCONTINUED | OUTPATIENT
Start: 2023-04-11 | End: 2023-04-11

## 2023-04-11 RX ORDER — ONDANSETRON 2 MG/ML
4 INJECTION INTRAMUSCULAR; INTRAVENOUS EVERY 6 HOURS PRN
Status: DISCONTINUED | OUTPATIENT
Start: 2023-04-11 | End: 2023-04-11

## 2023-04-11 RX ORDER — AMLODIPINE BESYLATE 10 MG/1
1 TABLET ORAL DAILY
COMMUNITY
Start: 2023-03-19

## 2023-04-11 RX ORDER — ONDANSETRON 4 MG/1
4 TABLET, ORALLY DISINTEGRATING ORAL EVERY 6 HOURS PRN
Status: DISCONTINUED | OUTPATIENT
Start: 2023-04-11 | End: 2023-04-11

## 2023-04-11 RX ORDER — ATENOLOL 50 MG/1
50 TABLET ORAL DAILY
Status: DISCONTINUED | OUTPATIENT
Start: 2023-04-11 | End: 2023-04-14 | Stop reason: HOSPADM

## 2023-04-11 RX ORDER — AMOXICILLIN 250 MG
1-2 CAPSULE ORAL 2 TIMES DAILY
Status: DISCONTINUED | OUTPATIENT
Start: 2023-04-11 | End: 2023-04-14 | Stop reason: HOSPADM

## 2023-04-11 RX ORDER — ONDANSETRON 4 MG/1
4 TABLET, ORALLY DISINTEGRATING ORAL EVERY 6 HOURS PRN
Status: DISCONTINUED | OUTPATIENT
Start: 2023-04-11 | End: 2023-04-14 | Stop reason: HOSPADM

## 2023-04-11 RX ORDER — AMLODIPINE BESYLATE 10 MG/1
10 TABLET ORAL DAILY
Status: DISCONTINUED | OUTPATIENT
Start: 2023-04-11 | End: 2023-04-14 | Stop reason: HOSPADM

## 2023-04-11 RX ADMIN — SODIUM CHLORIDE, POTASSIUM CHLORIDE, SODIUM LACTATE AND CALCIUM CHLORIDE 1000 ML: 600; 310; 30; 20 INJECTION, SOLUTION INTRAVENOUS at 15:52

## 2023-04-11 RX ADMIN — AZITHROMYCIN 500 MG: 500 INJECTION, POWDER, LYOPHILIZED, FOR SOLUTION INTRAVENOUS at 12:23

## 2023-04-11 RX ADMIN — CEFTRIAXONE SODIUM 1 G: 1 INJECTION, POWDER, FOR SOLUTION INTRAMUSCULAR; INTRAVENOUS at 13:44

## 2023-04-11 RX ADMIN — CEFTRIAXONE SODIUM 1 G: 1 INJECTION, POWDER, FOR SOLUTION INTRAMUSCULAR; INTRAVENOUS at 15:48

## 2023-04-11 RX ADMIN — IPRATROPIUM BROMIDE AND ALBUTEROL SULFATE 3 ML: .5; 2.5 SOLUTION RESPIRATORY (INHALATION) at 10:49

## 2023-04-11 RX ADMIN — SODIUM CHLORIDE: 9 INJECTION, SOLUTION INTRAVENOUS at 14:50

## 2023-04-11 RX ADMIN — AMLODIPINE BESYLATE 10 MG: 10 TABLET ORAL at 15:48

## 2023-04-11 RX ADMIN — ATENOLOL 50 MG: 50 TABLET ORAL at 15:48

## 2023-04-11 ASSESSMENT — ACTIVITIES OF DAILY LIVING (ADL)
ADLS_ACUITY_SCORE: 20
ADLS_ACUITY_SCORE: 33
ADLS_ACUITY_SCORE: 20
ADLS_ACUITY_SCORE: 20
ADLS_ACUITY_SCORE: 35
ADLS_ACUITY_SCORE: 35
ADLS_ACUITY_SCORE: 22
ADLS_ACUITY_SCORE: 20

## 2023-04-11 NOTE — CARE PLAN
End Of Shift Note    Situation: 84 yo female here for SOB and cough    Plan: Abx for pneumonia, IVF    Subjective/Objective:    Neuro: Afebrile, denies pain    Cardiac: HTN    Resp: 3 L NC, coarse crackles, productive cough with red tinged sputum    GI/: Reg diet, voiding in toilet, denies nausea    MSK: SBA for lines and tubes    Skin: Intact    LDAs: Left AV piv

## 2023-04-11 NOTE — ED TRIAGE NOTES
Pt here with cough since Saturday. Pt starting to feel shaky and faint at times. O2 89% on RA in triage, 2 L NC applied in triage. O2 up to 92%. Pt lives alone, Daughter-in-law at bedside.      Triage Assessment     Row Name 04/11/23 0738       Triage Assessment (Adult)    Airway WDL WDL       Respiratory WDL    Respiratory WDL X;cough    Cough Frequency frequent    Cough Type productive       Cardiac WDL    Cardiac WDL WDL       Cognitive/Neuro/Behavioral WDL    Cognitive/Neuro/Behavioral WDL WDL

## 2023-04-11 NOTE — ED PROVIDER NOTES
History     Chief Complaint   Patient presents with     Cough     HPI  Claudene Y Lundberg is a 83 year old female with past medical history significant for chronic kidney disease hypercholesterolemia essential hypertension who presents emergency department complaining of productive cough with shortness of breath.  Patient states symptoms have been present since Saturday night.  She initially had clear sputum cough but over the past few nights it is, yellow and then green and become more deep and harsh of a cough.  She does not think she has had a fever denies any chills.  She denies any headache or visual changes has not any chest pain feels a bit short of breath with activity.  She denies any abdominal pain has not any back pain denies any focal numbness weakness in any extremity.  She has not had any recent long trips or falls denies any calf pain or leg swelling.  She has not had any bowel or bladder dysfunction.    Allergies:  No Known Allergies    Problem List:    Patient Active Problem List    Diagnosis Date Noted     Chronic kidney disease, stage III (moderate) (H) 11/11/2009     Priority: Medium     Formatting of this note might be different from the original.  11/08 Creatinine 1.3  CREATININE                (MG/DL)   Date Value   11/11/09  1:50 PM 1.5*       Pure hypercholesterolemia 03/10/2008     Priority: Medium     3/08 Cholesterol 225 HDL 57  TG 90  TSH 1.26       Essential hypertension 03/07/2008     Priority: Medium        Past Medical History:    No past medical history on file.    Past Surgical History:    No past surgical history on file.    Family History:    No family history on file.    Social History:  Marital Status:   [2]  Social History     Tobacco Use     Smoking status: Never     Smokeless tobacco: Never        Medications:    amLODIPine (NORVASC) 5 MG tablet  atenolol (TENORMIN) 50 MG tablet  furosemide (LASIX) 20 MG tablet  lisinopril (ZESTRIL) 40 MG  "tablet  methylPREDNISolone (MEDROL DOSEPAK) 4 MG tablet therapy pack          Review of Systems  All systems reviewed and other than pertinent positives and negatives in HPI all other systems are negative.  Physical Exam   BP: 106/67  Pulse: 75  Temp: 99  F (37.2  C)  Resp: 21  Height: 165.1 cm (5' 5\")  Weight: 72.6 kg (160 lb)  SpO2: (!) 89 %      Physical Exam  Vitals and nursing note reviewed.   Constitutional:       General: She is not in acute distress.     Appearance: Normal appearance. She is not ill-appearing, toxic-appearing or diaphoretic.   HENT:      Head: Normocephalic and atraumatic.      Nose: Nose normal.      Mouth/Throat:      Mouth: Mucous membranes are moist.      Pharynx: Oropharynx is clear.   Eyes:      Conjunctiva/sclera: Conjunctivae normal.   Neck:      Comments: There is no JVD present.  Cardiovascular:      Rate and Rhythm: Normal rate and regular rhythm.      Pulses: Normal pulses.      Heart sounds: Normal heart sounds. No murmur heard.  Pulmonary:      Effort: Pulmonary effort is normal.      Breath sounds: No wheezing.      Comments: Faint wheezing in the left upper lung field with crackles at left base.  Abdominal:      General: Abdomen is flat. Bowel sounds are normal. There is no distension.      Palpations: Abdomen is soft.      Tenderness: There is no guarding or rebound.   Musculoskeletal:         General: No swelling or tenderness. Normal range of motion.      Cervical back: Normal range of motion and neck supple.      Right lower leg: No edema.      Left lower leg: No edema.   Skin:     General: Skin is warm and dry.      Capillary Refill: Capillary refill takes less than 2 seconds.      Findings: No rash.   Neurological:      General: No focal deficit present.      Mental Status: She is alert and oriented to person, place, and time.      Sensory: No sensory deficit.      Coordination: Coordination normal.   Psychiatric:         Mood and Affect: Mood normal.         ED Course "                 Procedures              EKG Interpretation:      Interpreted by Gurpreet Lomax MD  Rhythm: normal sinus   Rate: Normal  Axis: Normal  Ectopy: none  Conduction: normal  ST Segments/ T Waves: Non-specific ST-T wave changes  Q Waves: none  Comparison to prior: No old EKG available    Clinical Impression: Normal sinus rhythm with nonspecific ST-T wave changes.    Critical Care time:  none               Results for orders placed or performed during the hospital encounter of 04/11/23 (from the past 24 hour(s))   Symptomatic Influenza A/B, RSV, & SARS-CoV2 PCR (COVID-19) Nose    Specimen: Nose; Swab   Result Value Ref Range    Influenza A PCR Negative Negative    Influenza B PCR Negative Negative    RSV PCR Negative Negative    SARS CoV2 PCR Negative Negative    Narrative    Testing was performed using the Xpert Xpress CoV2/Flu/RSV Assay on the Cepheid GeneXpert Instrument. This test should be ordered for the detection of SARS-CoV-2, influenza, and RSV viruses in individuals who meet clinical and/or epidemiological criteria. Test performance is unknown in asymptomatic patients. This test is for in vitro diagnostic use under the FDA EUA for laboratories certified under CLIA to perform high or moderate complexity testing. This test has not been FDA cleared or approved. A negative result does not rule out the presence of PCR inhibitors in the specimen or target RNA in concentration below the limit of detection for the assay. If only one viral target is positive but coinfection with multiple targets is suspected, the sample should be re-tested with another FDA cleared, approved, or authorized test, if coinfection would change clinical management. This test was validated by the [facility]. These laboratories are certified under the Clinical Laboratory Improvement Amendments of 1988 (CLIA-88) as qualified to perform high complexity laboratory testing.   CBC with platelets differential     Narrative    The following orders were created for panel order CBC with platelets differential.  Procedure                               Abnormality         Status                     ---------                               -----------         ------                     CBC with platelets and d...[[id number]]  Abnormal            Final result               Manual Differential[[id number]]          Abnormal            Final result                 Please view results for these tests on the individual orders.   Comprehensive metabolic panel   Result Value Ref Range    Sodium 140 136 - 145 mmol/L    Potassium 4.3 3.4 - 5.3 mmol/L    Chloride 103 98 - 107 mmol/L    Carbon Dioxide (CO2) 25 22 - 29 mmol/L    Anion Gap 12 7 - 15 mmol/L    Urea Nitrogen 28.2 (H) 8.0 - 23.0 mg/dL    Creatinine 1.01 (H) 0.51 - 0.95 mg/dL    Calcium 9.5 8.8 - 10.2 mg/dL    Glucose 126 (H) 70 - 99 mg/dL    Alkaline Phosphatase 51 35 - 104 U/L    AST 21 10 - 35 U/L    ALT 13 10 - 35 U/L    Protein Total 6.9 6.4 - 8.3 g/dL    Albumin 4.2 3.5 - 5.2 g/dL    Bilirubin Total 0.6 <=1.2 mg/dL    GFR Estimate 55 (L) >60 mL/min/1.73m2   Lipase   Result Value Ref Range    Lipase 39 13 - 60 U/L   Troponin T, High Sensitivity   Result Value Ref Range    Troponin T, High Sensitivity 14 <=14 ng/L   Nt probnp inpatient (BNP)   Result Value Ref Range    N terminal Pro BNP Inpatient 286 0 - 1,800 pg/mL   CBC with platelets and differential   Result Value Ref Range    WBC Count 6.3 4.0 - 11.0 10e3/uL    RBC Count 4.34 3.80 - 5.20 10e6/uL    Hemoglobin 13.5 11.7 - 15.7 g/dL    Hematocrit 42.1 35.0 - 47.0 %    MCV 97 78 - 100 fL    MCH 31.1 26.5 - 33.0 pg    MCHC 32.1 31.5 - 36.5 g/dL    RDW 13.6 10.0 - 15.0 %    Platelet Count 139 (L) 150 - 450 10e3/uL   Manual Differential   Result Value Ref Range    % Neutrophils 73 %    % Lymphocytes 9 %    % Monocytes 18 %    % Eosinophils 0 %    % Basophils 0 %    Absolute Neutrophils 4.6 1.6 - 8.3 10e3/uL    Absolute Lymphocytes  0.6 (L) 0.8 - 5.3 10e3/uL    Absolute Monocytes 1.1 0.0 - 1.3 10e3/uL    Absolute Eosinophils 0.0 0.0 - 0.7 10e3/uL    Absolute Basophils 0.0 0.0 - 0.2 10e3/uL    RBC Morphology Confirmed RBC Indices     Platelet Assessment  Automated Count Confirmed. Platelet morphology is normal.     Automated Count Confirmed. Platelet morphology is normal.   Chest XR,  PA & LAT    Narrative    XR CHEST 2 VIEWS 4/11/2023 11:19 AM    HISTORY: cough; productive sputum    COMPARISON: None.      Impression    IMPRESSION: Large hiatal hernia. Left basilar linear and patchy  opacities could represent atelectasis and/or pneumonia. Indeterminate  patchy opacity projecting over the anterior mediastinum, best seen on  the lateral views, could also represent a infiltrate. A follow-up CT  after treatment is recommended to ensure resolution. No pleural  effusion or pneumothorax. Mild cardiomegaly. Tortuous aortic arch.  Thoracolumbar scoliosis.    EASTON SINGH MD         SYSTEM ID:  [id number]   Troponin T, High Sensitivity   Result Value Ref Range    Troponin T, High Sensitivity 12 <=14 ng/L       Medications - No data to display    Assessments & Plan (with Medical Decision Making) records were reviewed.  This included past medical history and medications.  Office visit from 11/1/2022 was reviewed.  Labs were obtained and reviewed.  His white count was 6.3 hemoglobin 13.5 platelet count 139.  There was no left shift.  Comprehensive metabolic panel significant for BUN slightly elevated 28.2.  Creatinine was 101 glucose 126 GFR was 55.  COVID and influenza were negative.  Troponin was initially 14.  proBNP was not elevated.  She was given a DuoNeb.  Her oxygen saturations dipped into the upper 80s and she was placed on 2 L nasal cannula.  I discussed obtaining a CT scan with possible PE protocol if D-dimer was elevated but patient stated she would not be able to do a CT scan even with any sedation.  She did not want to have this done.  I did  therefore order a chest x-ray.  Imaging was reviewed.  There was a large hiatal hernia present with left basilar linear and patchy opacities which could represent atelectatic and/or pneumonia.  There is indeterminant patchy opacity projecting over the anterior mediastinum could also represent an infiltrate.  Follow-up CT was recommended after treatment.  Patient was covered with ceftriaxone and Zithromax IV.  Her saturations remained about 92 to 93% on 2 L nasal cannula.  Findings discussed in detail with patient and son.  Patient will need to be admitted for further observation and further antibiotics.  Breathing treatments will be given to the patient.  Repeat troponin after 2 hours was 12.  I do not think this is ACS.  Case was discussed in detail with Fabian Tovar MD with hospitalist service and he is in agreement with admitting the patient for further evaluation and care     I have reviewed the nursing notes.    I have reviewed the findings, diagnosis, plan and need for follow up with the patient.           Current Discharge Medication List          Final diagnoses:   Cough, unspecified type   Pneumonia of lower lobe due to infectious organism, unspecified laterality       4/11/2023   [facility] EMERGENCY DEPT     Gurpreet Lomax MD  04/11/23 1913

## 2023-04-11 NOTE — CARE PLAN
"[facility] ADMISSION NOTE    Patient admitted to room 1000 at approximately 1400 via ambulation from emergency room. Patient was accompanied by son Jem.     Verbal SBAR report received from Slime LOZANO prior to patient arrival.     Patient ambulated to bed independently. Patient alert and oriented X 4. The patient is not having any pain.  . Admission vital signs: Blood pressure 132/81, pulse 70, temperature 99  F (37.2  C), temperature source Oral, resp. rate 18, height 1.651 m (5' 5\"), weight 72.5 kg (159 lb 13.3 oz), SpO2 93 %. Patient was oriented to plan of care, call light, bed controls, tv, telephone, bathroom and visiting hours.     Risk Assessment    The following safety risks were identified during admission: fall. Yellow risk band applied: YES.     Skin Initial Assessment    This writer admitted this patient and completed a full skin assessment and Albino score in the Adult PCS flowsheet. Appropriate interventions initiated as needed.     Secondary skin check completed by Darell LOZANO.    Albino Risk Assessment  Sensory Perception: 4-->no impairment  Moisture: 4-->rarely moist  Activity: 4-->walks frequently  Mobility: 4-->no limitation  Nutrition: 4-->excellent  Friction and Shear: 3-->no apparent problem  Albino Score: 23  Mattress: Standard gel/foam mattress (IsoFlex, Atmos Air, etc.)  Bed Frame: Standard width and length    Education    Patient has a [place] to Observation order: Yes  Observation education completed and documented: Yes      Juan Ch RN    "

## 2023-04-11 NOTE — PROGRESS NOTES
Skin affirmation note    Admitting nurse completed full skin assessment, Albino score and Albino interventions. This writer agrees with the initial skin assessment findings.

## 2023-04-11 NOTE — H&P
"[facility]    History and Physical  Hospital Medicine       Date of Admission:  4/11/2023  Date of Service: 4/11/2023     Assessment & Plan   Claudene Y Lundberg is a 83 year old female who presents on 4/11/2023 with cough. Admitted for treatment of pneumonia.     Pneumonia of lower lobe due to infectious organism, unspecified laterality  Acute hpyoxemic respiratory failure  Sore throat and cough beginning 4/9, slowly worsening. Hypoxic on presentation, requiring 3L on admission to maintain SPO2 >90%. CXR shows left lower lobe infiltrate, likely infectious. Not septic (no leukocytosis, afebrile, no tachycardia). Initiated on ceftriaxone & azithromycin in ER.   -Continue ceftriaxone 2g IV [id number]  -Continue azithromycin 500mg IV [id number]  -Continue oxygen via nasal canula, titrate to SPO2 90%  -Lactated ringer 1L over 10 hours (100/hr) for gentle fluid maintenance    Essential hypertension  Controlled since presentation.  Managed PTA with amlodipine 10 mg daily, atenolol 50 mg daily, furosemide 30 mg daily, lisinopril 40 mg daily.  -Continue PTA amlodipine and atenolol with hold parameters  -Hold PTA furosemide while receiving IV fluids, above  -Hold PTA lisinopril until kidney function improves    Thrombocytopenia  Platelets 139 on admission, no signs of acute bleeding. Monitor.     Chronic kidney disease, stage III (moderate)  Near baseline. Baseline creatinine around 0.8 - 0.9. Creatinine on presentation 1.01.   -BMP in AM    Pure hypercholesterolemia  Noted in history, not on any outpatient pharmacologic management.  Monitor.  Follow-up with primary care for ongoing surveillance and management.    Clinically Significant Risk Factors Present on Admission                  # Hypertension: home medication list includes antihypertensive(s)      # Overweight: Estimated body mass index is 26.6 kg/m  as calculated from the following:    Height as of this encounter: 1.651 m (5' 5\").    Weight " as of this encounter: 72.5 kg (159 lb 13.3 oz).            Diet:  regular  DVT Prophylaxis: Ambulate every shift  Barron Catheter: Not present  Code Status:   DNR / DNI  Lines: PIV    Disposition Plan      Expected Discharge Date: 04/13/2023             Entered: Rosibel Campbell PA-C 04/11/2023, 2:11 PM     Status: Patient is appropriate for observation  Rosibel Campbell PA-C        The patient's care was discussed with the Attending Physician, Dr. Fabian Tovar, bedside RN, and the patient.    Primary Care Physician   Clinic, [facility] [phone]    History is obtained from the patient, who is a decent historian, handoff from ER provider, and review of old records via the EMR.    History of Present Illness   Claudene Y Lundberg is a 83 year old female with past medical history of chronic kidney disease, hypertension now presents on 4/11/2023 with cough.    Patient had sore throat beginning early Sunday 4/9.  Later during day 4/9 patient developed productive cough of white to green sputum.  Cough has progressively worsened with episodes of dyspnea.  Dyspnea is not positional or exertional, but is associated with coughing fits.  Patient occasionally is coughing so hard that she gags.  No vomiting.  No aspirating.  No chest pain with coughing.  No pleuritic chest pain with deep breathing. Denies fevers or chills. A.m. 4/11 patient began noticing small streaks of blood in sputum.  Presented to the emergency department for persistent worsening cough.    Upon arrival to the emergency department patient received lab and imaging work-up.  She received 1 g ceftriaxone and 500 mg azithromycin IV.    Patient is not immunocompromised and does not have a history of chronic lung disease.  She was around family on Easter weekend, including around 3-year-old grandson.  Patient says none of the people she was with this past weekend have gotten sick.    Review of Systems   Constitutional: denies weight loss, fever,  "chills   Eyes: denies changes in vision, discharge, or pain in eyes  HENT: denies changes in hearing  Respiratory: see HPI  Cardiovascular: denies chest pain, heart palpitations, lightheadedness, syncope, limb swelling  Gastroenterology: denies constipation, diarrhea, GERD symptoms  Genitourinary: denies dysuria  Integumentary: no new rashes or skin changes  Musculoskeletal: denies new muscle pain or joint trauma  Neuro: denies numbness, tingling, headaches, tremor  Psychiatric: denies significant changes to mood    Past Medical History      Chronic kidney disease, stage III (moderate) (H) 11/11/2009     Priority: Medium     Pure hypercholesterolemia 03/10/2008     Priority: Medium     Essential hypertension 03/07/2008     Priority: Medium      Past Surgical History   Surgical history reviewed, no pertinent surgical history provided.     Prior to Admission Medications   Prior to Admission Medications   Prescriptions Last Dose Informant Patient Reported? Taking?   amLODIPine (NORVASC) 10 MG tablet 4/10/2023 at am Self Yes Yes   Sig: Take 1 tablet by mouth daily   atenolol (TENORMIN) 50 MG tablet 4/10/2023 at am Self Yes Yes   Sig: Take 50 mg by mouth daily   furosemide (LASIX) 20 MG tablet 4/10/2023 at am Self Yes Yes   Sig: Take 30 mg by mouth daily   lisinopril (ZESTRIL) 40 MG tablet 4/10/2023 at am Self Yes Yes   Sig: Take 40 mg by mouth daily      Facility-Administered Medications: None     Allergies   No Known Allergies    Family History    Family history reviewed, no pertinent family history provided.     Social History   Social History     Socioeconomic History     Marital status:      Physical Exam   /81   Pulse 70   Temp 99  F (37.2  C) (Oral)   Resp 18   Ht 1.651 m (5' 5\")   Wt 72.5 kg (159 lb 13.3 oz)   SpO2 93%   BMI 26.60 kg/m       Weight: 159 lbs 13.34 oz Body mass index is 26.6 kg/m .     Constitutional: Alert, oriented, cooperative, sitting in bed, no apparent distress, appears " nontoxic  Eyes: Eyes are clear, no exudate or icterus. Pupils equal, round.   HENT: Oropharynx is clear. No evidence of cranial trauma.  Cardiovascular: Regular rate and rhythm, normal S1 and S2, with 2/6 systolic murmur noted over RUSB and LUSB. Good peripheral pulses in wrists bilaterally. No lower extremity edema. Dorsalis pedis pulses 2+ bilaterally.   Respiratory: Unlabored with conversation on 3L NC. Some crackles and soft expiratory rhonchi in LL lobe, otherwise remainder of lung fields are clear to auscultation. No wheezes.   GI: Non-distended, soft, non-tender, normal bowel sounds  Musculoskeletal: Normal muscle bulk, no obvious joint deformities  Skin: Warm and dry, no rashes on exposed skin areas.   Neurologic: Neck supple. Cranial nerves are grossly intact.  is symmetric. Speech intact without slurring.     Data   Data reviewed today:   Recent Labs   Lab 04/11/23  0921   WBC 6.3   HGB 13.5   MCV 97   *      POTASSIUM 4.3   CHLORIDE 103   CO2 25   BUN 28.2*   CR 1.01*   ANIONGAP 12   CHASE 9.5   *   ALBUMIN 4.2   PROTTOTAL 6.9   BILITOTAL 0.6   ALKPHOS 51   ALT 13   AST 21   LIPASE 39     Recent Results (from the past 24 hour(s))   Chest XR,  PA & LAT    Narrative    XR CHEST 2 VIEWS 4/11/2023 11:19 AM  HISTORY: cough; productive sputum  COMPARISON: None.    Impression    IMPRESSION: Large hiatal hernia. Left basilar linear and patchy  opacities could represent atelectasis and/or pneumonia. Indeterminate  patchy opacity projecting over the anterior mediastinum, best seen on  the lateral views, could also represent a infiltrate. A follow-up CT  after treatment is recommended to ensure resolution. No pleural  effusion or pneumothorax. Mild cardiomegaly. Tortuous aortic arch.  Thoracolumbar scoliosis.  EASTON SINGH MD   SYSTEM ID:  [id number]     I personally reviewed the EKG tracing showing sinus rhythm

## 2023-04-11 NOTE — PHARMACY-ADMISSION MEDICATION HISTORY
Medication Scribe Admission Medication History    Admission medication history is complete. The information provided in this note is only as accurate as the sources available at the time of the update.    Medication reconciliation/reorder completed by provider prior to medication history? No    Information Source(s): Patient via in-person    Pertinent Information: Patient didn't take her AM meds this morning because she was here.    Changes made to PTA medication list:    Added: Amlodipine 10 mg.    Deleted: Amlodipine 5 mg, Methylprednisolone dosepak from 1/16/23.    Changed: Atenolol 50 mg from no sig to daily.  Furosemide from 20 mg daily to 30 mg daily.  Lisinopril 40 mg from no sig to daily.    Medication Affordability:  Not including over the counter (OTC) medications, was there a time in the past 12 months when you did not take your medications as prescribed because of cost?: No    Allergies reviewed with patient and updates made in EHR: yes    Medication History Completed By: Justine Varner 4/11/2023 12:33 PM    PTA Med List   Medication Sig Last Dose     amLODIPine (NORVASC) 10 MG tablet Take 1 tablet by mouth daily 4/10/2023 at am     atenolol (TENORMIN) 50 MG tablet Take 50 mg by mouth daily 4/10/2023 at am     furosemide (LASIX) 20 MG tablet Take 30 mg by mouth daily 4/10/2023 at am     lisinopril (ZESTRIL) 40 MG tablet Take 40 mg by mouth daily 4/10/2023 at am

## 2023-04-12 LAB
ALBUMIN SERPL BCG-MCNC: 3.4 G/DL (ref 3.5–5.2)
ALP SERPL-CCNC: 43 U/L (ref 35–104)
ALT SERPL W P-5'-P-CCNC: 9 U/L (ref 10–35)
ANION GAP SERPL CALCULATED.3IONS-SCNC: 9 MMOL/L (ref 7–15)
AST SERPL W P-5'-P-CCNC: 14 U/L (ref 10–35)
BASOPHILS # BLD AUTO: 0 10E3/UL (ref 0–0.2)
BASOPHILS NFR BLD AUTO: 0 %
BILIRUB SERPL-MCNC: 0.4 MG/DL
BUN SERPL-MCNC: 24.5 MG/DL (ref 8–23)
CALCIUM SERPL-MCNC: 9.6 MG/DL (ref 8.8–10.2)
CHLORIDE SERPL-SCNC: 104 MMOL/L (ref 98–107)
CREAT SERPL-MCNC: 0.87 MG/DL (ref 0.51–0.95)
DEPRECATED HCO3 PLAS-SCNC: 24 MMOL/L (ref 22–29)
EOSINOPHIL # BLD AUTO: 0 10E3/UL (ref 0–0.7)
EOSINOPHIL NFR BLD AUTO: 0 %
ERYTHROCYTE [DISTWIDTH] IN BLOOD BY AUTOMATED COUNT: 14.1 % (ref 10–15)
GFR SERPL CREATININE-BSD FRML MDRD: 66 ML/MIN/1.73M2
GLUCOSE SERPL-MCNC: 97 MG/DL (ref 70–99)
HCT VFR BLD AUTO: 37.6 % (ref 35–47)
HGB BLD-MCNC: 11.9 G/DL (ref 11.7–15.7)
IMM GRANULOCYTES # BLD: 0.4 10E3/UL
IMM GRANULOCYTES NFR BLD: 4 %
LYMPHOCYTES # BLD AUTO: 0.6 10E3/UL (ref 0.8–5.3)
LYMPHOCYTES NFR BLD AUTO: 6 %
MCH RBC QN AUTO: 30.7 PG (ref 26.5–33)
MCHC RBC AUTO-ENTMCNC: 31.6 G/DL (ref 31.5–36.5)
MCV RBC AUTO: 97 FL (ref 78–100)
MONOCYTES # BLD AUTO: 1.4 10E3/UL (ref 0–1.3)
MONOCYTES NFR BLD AUTO: 14 %
NEUTROPHILS # BLD AUTO: 7.9 10E3/UL (ref 1.6–8.3)
NEUTROPHILS NFR BLD AUTO: 76 %
NRBC # BLD AUTO: 0 10E3/UL
NRBC BLD AUTO-RTO: 0 /100
PLATELET # BLD AUTO: 113 10E3/UL (ref 150–450)
POTASSIUM SERPL-SCNC: 4.5 MMOL/L (ref 3.4–5.3)
PROT SERPL-MCNC: 6 G/DL (ref 6.4–8.3)
RBC # BLD AUTO: 3.87 10E6/UL (ref 3.8–5.2)
SODIUM SERPL-SCNC: 137 MMOL/L (ref 136–145)
WBC # BLD AUTO: 10.3 10E3/UL (ref 4–11)

## 2023-04-12 PROCEDURE — 250N000011 HC RX IP 250 OP 636

## 2023-04-12 PROCEDURE — 258N000003 HC RX IP 258 OP 636

## 2023-04-12 PROCEDURE — 99232 SBSQ HOSP IP/OBS MODERATE 35: CPT | Performed by: INTERNAL MEDICINE

## 2023-04-12 PROCEDURE — 80053 COMPREHEN METABOLIC PANEL: CPT

## 2023-04-12 PROCEDURE — 96376 TX/PRO/DX INJ SAME DRUG ADON: CPT

## 2023-04-12 PROCEDURE — G0378 HOSPITAL OBSERVATION PER HR: HCPCS

## 2023-04-12 PROCEDURE — 250N000013 HC RX MED GY IP 250 OP 250 PS 637

## 2023-04-12 PROCEDURE — 36415 COLL VENOUS BLD VENIPUNCTURE: CPT

## 2023-04-12 PROCEDURE — 85025 COMPLETE CBC W/AUTO DIFF WBC: CPT

## 2023-04-12 PROCEDURE — 120N000004 HC R&B MS OVERFLOW

## 2023-04-12 RX ORDER — AZITHROMYCIN 250 MG/1
500 TABLET, FILM COATED ORAL ONCE
Status: COMPLETED | OUTPATIENT
Start: 2023-04-13 | End: 2023-04-13

## 2023-04-12 RX ORDER — CEFDINIR 300 MG/1
300 CAPSULE ORAL EVERY 12 HOURS SCHEDULED
Status: DISCONTINUED | OUTPATIENT
Start: 2023-04-13 | End: 2023-04-14 | Stop reason: HOSPADM

## 2023-04-12 RX ADMIN — AZITHROMYCIN 500 MG: 500 INJECTION, POWDER, LYOPHILIZED, FOR SOLUTION INTRAVENOUS at 12:10

## 2023-04-12 RX ADMIN — ATENOLOL 50 MG: 50 TABLET ORAL at 09:00

## 2023-04-12 RX ADMIN — AMLODIPINE BESYLATE 10 MG: 10 TABLET ORAL at 09:00

## 2023-04-12 RX ADMIN — CEFTRIAXONE SODIUM 2 G: 2 INJECTION, POWDER, FOR SOLUTION INTRAMUSCULAR; INTRAVENOUS at 11:06

## 2023-04-12 ASSESSMENT — ACTIVITIES OF DAILY LIVING (ADL)
ADLS_ACUITY_SCORE: 22

## 2023-04-12 NOTE — UTILIZATION REVIEW
Admission Status; Secondary Review Determination     Admission Date: 4/11/2023  9:00 AM       Under the authority of the Utilization Management Committee, the utilization review process indicated a secondary review on the above patient.  The review outcome is based on review of the medical records, discussions with staff, and applying clinical experience noted on the date of the review.        (x)      Inpatient Status Appropriate - This patient's medical care is consistent with medical management for inpatient care and reasonable inpatient medical practice.       RATIONALE FOR DETERMINATION      Brief clinical presentation, information copied from the chart, abbreviated and edited for relevant content:     Paged team to advance to .     Claudene Y Lundberg is a 83 year old female who presents on 4/11/2023 with cough. Admitted for treatment of pneumonia of lower lobe due to infectious organism and acute hypoxemic respiratory failure. Hypoxic on presentation, requiring 3L on admission to maintain SPO2 >90%. CXR shows left lower lobe infiltrate, likely infectious. Admitted on ceftriaxone IV and  azithromycin IV. Plan to Continue oxygen via nasal canula, titrate to SPO2 90%. On Lactated ringer 1L over 10 hours (100/hr) for gentle fluid maintenance on admission. Unable to wean oxygen off on 4/12/2023, will continue to wean oxygen and reassess tomorrow         At the time of admission with the information available to the attending physician, more than 2 nights hospital complex care was anticipated. Also, there was a risk of adverse outcome if patient was treated outpatient or observation. High intensity of services anticipated. Inpatient admission appropriate based on Medicare guidelines.       The information on this document is developed by the utilization review team in order for the business office to ensure compliance.  This only denotes the appropriateness of proper admission status and does not reflect the  quality of care rendered.         The definitions of Inpatient Status and Observation Status used in making the determination above are those provided in the CMS Coverage Manual, Chapter 1 and Chapter 6, section 70.4.      Sincerely,      Ileana Brewer MD   Utilization Review/ Case Management  [facility].

## 2023-04-12 NOTE — PROGRESS NOTES
End Of Shift Note    Situation: Pneumonia    Plan: ABX, oxygen    Subjective/Objective:    Neuro: A&Ox 4    Cardiac: VSS    Resp: currently at 1.5L O2, saturations low 90's.    GI/: Voiding appropriately. Denies nausea or diarrhea    MSK: SBA. Steady gait    Skin: Intact    LDAs: PIV. SL

## 2023-04-12 NOTE — PROGRESS NOTES
[facility]    Hospitalist Progress Note    Date of Service (when I saw the patient): 04/12/2023    Assessment & Plan   Claudene Y Lundberg is a 83 year old female who presents on 4/11/2023 with cough. Admitted for treatment of pneumonia.      Pneumonia of lower lobe due to infectious organism, unspecified laterality  Acute hypoxemic respiratory failure  Sore throat and cough beginning 4/9, slowly worsening. Hypoxic on presentation, requiring 3L on admission to maintain SPO2 >90%. CXR shows left lower lobe infiltrate, likely infectious. Not septic (no leukocytosis, afebrile, no tachycardia). Initiated on ceftriaxone & azithromycin in ER.   -Admitted on ceftriaxone 2g IV [id number]  -Admitted on azithromycin 500mg IV [id number]  -Continue oxygen via nasal canula, titrate to SPO2 90%  -Lactated ringer 1L over 10 hours (100/hr) for gentle fluid maintenance on admission  -Change to cefdinir 30 mg twice daily and azithromycin 500 mg tomorrow.  -Unable to wean oxygen off on 4/12/2023, will continue to wean oxygen and reassess tomorrow     Essential hypertension  Controlled since presentation.  Managed PTA with amlodipine 10 mg daily, atenolol 50 mg daily, furosemide 30 mg daily, lisinopril 40 mg daily.  -Continue PTA amlodipine and atenolol with hold parameters  -Hold PTA furosemide while receiving IV fluids, above  -Hold PTA lisinopril until kidney function improves  -Creatinine improved to 0.87, but elevated BUN suggests continued volume depletion, will encourage oral intake     Thrombocytopenia  Platelets 139 on admission, no signs of acute bleeding. Monitor.      Chronic kidney disease, stage III (moderate)  Near baseline. Baseline creatinine around 0.8 - 0.9. Creatinine on presentation 1.01.   -BMP in AM     Pure hypercholesterolemia  Noted in history, not on any outpatient pharmacologic management.  Monitor.  Follow-up with primary care for ongoing surveillance and management.    Clinically  "Significant Risk Factors Present on Admission         # Hypertension: home medication list includes antihypertensive(s)      # Overweight: Estimated body mass index is 26.6 kg/m  as calculated from the following:    Height as of this encounter: 1.651 m (5' 5\").    Weight as of this encounter: 72.5 kg (159 lb 13.3 oz).         Diet:  regular  DVT Prophylaxis: Ambulate every shift  Barron Catheter: Not present  Code Status:   DNR / DNI  Lines: PIV    Disposition: Expected discharge in 1-2 days once patient weaned off oxygen.    Fabian Tovar MD    Interval History   Patient states that her breathing is improved, but she continues to have a productive cough.  She denies fevers or chills overnight.    -Data reviewed today: I reviewed all new labs and imaging results over the last 24 hours. I personally reviewed no images or EKG's today.    Physical Exam   Temp: 97.6  F (36.4  C) Temp src: Oral BP: 121/65 Pulse: 65   Resp: 18 SpO2: 91 % O2 Device: Nasal cannula Oxygen Delivery: 2 LPM  Vitals:    04/11/23 0737 04/11/23 1400 04/12/23 0359   Weight: 72.6 kg (160 lb) 72.5 kg (159 lb 13.3 oz) 74.9 kg (165 lb 2 oz)     Vital Signs with Ranges  Temp:  [97.6  F (36.4  C)-99.8  F (37.7  C)] 97.6  F (36.4  C)  Pulse:  [57-80] 65  Resp:  [16-20] 18  BP: ()/(46-81) 121/65  SpO2:  [90 %-98 %] 91 %  I/O last 3 completed shifts:  In: 480 [P.O.:480]  Out: -     Gen: Well nourished, well developed, alert and oriented x 3, no acute distressed  HEENT: Atraumatic, normocephalic; sclera non-injected, anicterric; oral mucosa moist, no lesion, no exudate  Lungs: Bibasilar rales, no wheezes, no rhonchi  Heart: Regular rate, regular rhythm, no gallops, no rubs, no murmurs  GI: Bowel sound normal, no hepatosplenomegaly, no masses, non-tender, non-distended, no guarding, no rebound tenderness  Lymph: No lymphadenopathy, no edema  Skin: No rashes, no chronic venous stasis     Medications       amLODIPine  10 mg Oral Daily     atenolol  " 50 mg Oral Daily     azithromycin  500 mg Intravenous Q24H     [Held by provider] furosemide  30 mg Oral Daily     [Held by provider] lisinopril  40 mg Oral Daily     senna-docusate  1-2 tablet Oral BID       Data   Recent Labs   Lab 04/12/23  0413 04/11/23  0921   WBC 10.3 6.3   HGB 11.9 13.5   MCV 97 97   * 139*    140   POTASSIUM 4.5 4.3   CHLORIDE 104 103   CO2 24 25   BUN 24.5* 28.2*   CR 0.87 1.01*   ANIONGAP 9 12   CHASE 9.6 9.5   GLC 97 126*   ALBUMIN 3.4* 4.2   PROTTOTAL 6.0* 6.9   BILITOTAL 0.4 0.6   ALKPHOS 43 51   ALT 9* 13   AST 14 21   LIPASE  --  39       No results found for this or any previous visit (from the past 24 hour(s)).

## 2023-04-12 NOTE — PLAN OF CARE
End Of Shift Note    Situation: 83 year old female here for pneumonia    Plan: IV ABX, monitor respiratory status and O2 stats    Subjective/Objective:    Neuro: A&Ox4. Denies pain. Afebrile.     Cardiac: VSS. Normotensive.    Resp: On 2 L NC with O2 sats in low to mid 90s. LS diminished with crackles in lower lobes. Unable to wean off of O2 as O2 sats go down to high 80s when changed to 1 L NC.    GI/: Voinding adequately. Regular diet.    MSK: SBA. Steady when ambulating.    Skin: No new deficits noted.    LDAs: PIV in L AC SL.     IV azithromycin changed to PO azithromycin. Started on PO cefdinir.

## 2023-04-13 ENCOUNTER — APPOINTMENT (OUTPATIENT)
Dept: PHYSICAL THERAPY | Facility: CLINIC | Age: 83
DRG: 193 | End: 2023-04-13
Payer: COMMERCIAL

## 2023-04-13 PROCEDURE — 250N000013 HC RX MED GY IP 250 OP 250 PS 637

## 2023-04-13 PROCEDURE — 97161 PT EVAL LOW COMPLEX 20 MIN: CPT | Mod: GP | Performed by: PHYSICAL THERAPIST

## 2023-04-13 PROCEDURE — 99232 SBSQ HOSP IP/OBS MODERATE 35: CPT | Performed by: INTERNAL MEDICINE

## 2023-04-13 PROCEDURE — 250N000013 HC RX MED GY IP 250 OP 250 PS 637: Performed by: INTERNAL MEDICINE

## 2023-04-13 PROCEDURE — 120N000004 HC R&B MS OVERFLOW

## 2023-04-13 RX ADMIN — CEFDINIR 300 MG: 300 CAPSULE ORAL at 19:50

## 2023-04-13 RX ADMIN — CEFDINIR 300 MG: 300 CAPSULE ORAL at 08:37

## 2023-04-13 RX ADMIN — AMLODIPINE BESYLATE 10 MG: 10 TABLET ORAL at 08:37

## 2023-04-13 RX ADMIN — ATENOLOL 50 MG: 50 TABLET ORAL at 08:37

## 2023-04-13 RX ADMIN — AZITHROMYCIN MONOHYDRATE 500 MG: 250 TABLET ORAL at 12:20

## 2023-04-13 ASSESSMENT — ACTIVITIES OF DAILY LIVING (ADL)
ADLS_ACUITY_SCORE: 22

## 2023-04-13 NOTE — PROGRESS NOTES
Pt appeared to sleep at long intervals in between cares throughout the night. Pt expressed desire to go home today. O2 remained at 2 liters NC overnight, as sats were 91-92% at rest.

## 2023-04-13 NOTE — PROGRESS NOTES
23 0214   Appointment Info   Signing Clinician's Name / Credentials (PT) Sayra Long, PT   Living Environment   People in Home alone   Current Living Arrangements   (1 level townhouse)   Living Environment Comments no steps, pt uses a tub to bath, indep with driving, ADLs and IADLs, spouse  4 mo ago, family currently out of town on vacation   Self-Care   Usual Activity Tolerance good   Current Activity Tolerance moderate   Activity/Exercise/Self-Care Comment no AD used   General Information   Onset of Illness/Injury or Date of Surgery 23   Referring Physician Fabian Tovar MD   Patient/Family Therapy Goals Statement (PT) to return home hopefully without O2   Pertinent History of Current Problem (include personal factors and/or comorbidities that impact the POC) PNA, currently on 1L O2 via NC   Existing Precautions/Restrictions oxygen therapy device and L/min   Cognition   Affect/Mental Status (Cognition) WNL   Orientation Status (Cognition) oriented x 4   Pain Assessment   Patient Currently in Pain No   Range of Motion (ROM)   ROM Comment able to put socks on indep   Strength (Manual Muscle Testing)   Strength (Manual Muscle Testing) Able to perform R SLR;Able to perform L SLR   Bed Mobility   Comment, (Bed Mobility) indep   Transfers   Comment, (Transfers) indep   Gait/Stairs (Locomotion)   [place] Level (Gait) independent   Distance in Feet 150' in room indep, no AD, without O2, dropped to 84% at lowest after walking without O2, with 1L was 91% at rest, pt denies SOB   Clinical Impression   Criteria for Skilled Therapeutic Intervention Evaluation only   Clinical Presentation (PT Evaluation Complexity) Stable/Uncomplicated   Clinical Presentation Rationale clinical judgement   Clinical Decision Making (Complexity) low complexity   PT Total Evaluation Time   PT Eval, Low Complexity Minutes (12546) 15   PT Discharge Planning   PT Discharge Recommendation (DC Rec) home   PT Rationale for  DC Rec pt indep and able to care for herself, question if may need O2 but pt reports she hopes she will not   PT Brief overview of current status 150' in room indep, no AD, on RA SpO2 84% at lowest after walking, was 91% at rest on 1L, up to 87% on RA at rest

## 2023-04-13 NOTE — PROGRESS NOTES
Patient weaned to 1L with sats >90% at rest. PT/ OT to see patient today. She states she is feeling better and wants to get up to chair more often today.

## 2023-04-13 NOTE — PROGRESS NOTES
[facility]    Hospitalist Progress Note    Date of Service (when I saw the patient): 04/13/2023    Assessment & Plan   Claudene Y Lundberg is a 83 year old female who presents on 4/11/2023 with cough. Admitted for treatment of pneumonia.      Pneumonia of lower lobe due to infectious organism, unspecified laterality  Acute hypoxemic respiratory failure  Sore throat and cough beginning 4/9, slowly worsening. Hypoxic on presentation, requiring 3L on admission to maintain SPO2 >90%. CXR shows left lower lobe infiltrate, likely infectious. Not septic (no leukocytosis, afebrile, no tachycardia). Initiated on ceftriaxone & azithromycin in ER.   -Admitted on ceftriaxone 2g IV [id number]  -Admitted on azithromycin 500mg IV [id number] x 3, completed 4/13  -Continue oxygen via nasal canula, titrate to SPO2 90%  -Lactated ringer 1L over 10 hours (100/hr) for gentle fluid maintenance on admission, now on TKO  -Change to cefdinir 30 mg twice daily and azithromycin 500 mg tomorrow.  -Unable to wean oxygen off on 4/12 and AM of 4/13, will continue to wean oxygen and reassess tomorrow     Essential hypertension  Controlled since presentation.  Managed PTA with amlodipine 10 mg daily, atenolol 50 mg daily, furosemide 30 mg daily, lisinopril 40 mg daily.  -Continue PTA amlodipine and atenolol with hold parameters  -Hold PTA furosemide while receiving IV fluids, above  -Hold PTA lisinopril until kidney function improves  -Creatinine improved to 0.87, but elevated BUN suggests continued volume depletion, will encourage oral intake  -Recheck BMP in AM     Thrombocytopenia  Platelets 139 on admission, no signs of acute bleeding. Monitor.      Chronic kidney disease, stage III (moderate)  Near baseline. Baseline creatinine around 0.8 - 0.9. Creatinine on presentation 1.01.   -BMP in AM     Pure hypercholesterolemia  Noted in history, not on any outpatient pharmacologic management.  Monitor.  Follow-up with primary  "care for ongoing surveillance and management.    Clinically Significant Risk Factors Present on Admission         # Hypertension: home medication list includes antihypertensive(s)      # Overweight: Estimated body mass index is 26.6 kg/m  as calculated from the following:    Height as of this encounter: 1.651 m (5' 5\").    Weight as of this encounter: 72.5 kg (159 lb 13.3 oz).         Diet:  regular  DVT Prophylaxis: Ambulate every shift  Barron Catheter: Not present  Code Status:   DNR / DNI  Lines: PIV    Disposition: Expected discharge in 1-2 days once patient weaned off oxygen.    Fabian Tovar MD    Interval History   Patient continues to complain of lethargy and malaise with a productive cough and dyspnea on exertion.  On 2 L oxygen overnight, unable to wean this AM.    -Data reviewed today: I reviewed all new labs and imaging results over the last 24 hours. I personally reviewed no images or EKG's today.    Physical Exam   Temp: 98.3  F (36.8  C) Temp src: Oral BP: 138/71 Pulse: 73   Resp: 18 SpO2: 95 % O2 Device: Nasal cannula Oxygen Delivery: 1.5 LPM  Vitals:    04/11/23 0737 04/11/23 1400 04/12/23 0359   Weight: 72.6 kg (160 lb) 72.5 kg (159 lb 13.3 oz) 74.9 kg (165 lb 2 oz)     Vital Signs with Ranges  Temp:  [97.6  F (36.4  C)-99.2  F (37.3  C)] 98.3  F (36.8  C)  Pulse:  [] 73  Resp:  [18-20] 18  BP: ()/(65-81) 138/71  SpO2:  [88 %-95 %] 95 %  I/O last 3 completed shifts:  In: 720 [P.O.:720]  Out: -     Gen: Well nourished, well developed, alert and oriented x 3, no acute distressed  HEENT: Atraumatic, normocephalic; sclera non-injected, anicterric; oral mucosa moist, no lesion, no exudate  Lungs: Bibasilar rales with prolonged expirations, no wheezes, no rhonchi  Heart: Regular rate, regular rhythm, no gallops, no rubs, no murmurs  GI: Bowel sound normal, no hepatosplenomegaly, no masses, non-tender, non-distended, no guarding, no rebound tenderness  Lymph: No lymphadenopathy, no " edema  Skin: No rashes, no chronic venous stasis     Medications       amLODIPine  10 mg Oral Daily     atenolol  50 mg Oral Daily     azithromycin  500 mg Oral Once     cefdinir  300 mg Oral Q[facility]H [facility]08[facility]     [Held by provider] furosemide  30 mg Oral Daily     [Held by provider] lisinopril  40 mg Oral Daily     senna-docusate  1-2 tablet Oral BID       Data   Recent Labs   Lab 04/12/23  0413 04/11/23  0921   WBC 10.3 6.3   HGB 11.9 13.5   MCV 97 97   * 139*    140   POTASSIUM 4.5 4.3   CHLORIDE 104 103   CO2 24 25   BUN 24.5* 28.2*   CR 0.87 1.01*   ANIONGAP 9 12   CHASE 9.6 9.5   GLC 97 126*   ALBUMIN 3.4* 4.2   PROTTOTAL 6.0* 6.9   BILITOTAL 0.4 0.6   ALKPHOS 43 51   ALT 9* 13   AST 14 21   LIPASE  --  39       No results found for this or any previous visit (from the past 24 hour(s)).

## 2023-04-14 VITALS
DIASTOLIC BLOOD PRESSURE: 83 MMHG | BODY MASS INDEX: 27.51 KG/M2 | OXYGEN SATURATION: 90 % | HEIGHT: 65 IN | TEMPERATURE: 97.4 F | WEIGHT: 165.12 LBS | HEART RATE: 62 BPM | RESPIRATION RATE: 15 BRPM | SYSTOLIC BLOOD PRESSURE: 131 MMHG

## 2023-04-14 LAB
ANION GAP SERPL CALCULATED.3IONS-SCNC: 9 MMOL/L (ref 7–15)
BUN SERPL-MCNC: 18.5 MG/DL (ref 8–23)
CALCIUM SERPL-MCNC: 9.6 MG/DL (ref 8.8–10.2)
CHLORIDE SERPL-SCNC: 105 MMOL/L (ref 98–107)
CREAT SERPL-MCNC: 0.74 MG/DL (ref 0.51–0.95)
DEPRECATED HCO3 PLAS-SCNC: 26 MMOL/L (ref 22–29)
GFR SERPL CREATININE-BSD FRML MDRD: 80 ML/MIN/1.73M2
GLUCOSE SERPL-MCNC: 96 MG/DL (ref 70–99)
HOLD SPECIMEN: NORMAL
PLATELET # BLD AUTO: 135 10E3/UL (ref 150–450)
POTASSIUM SERPL-SCNC: 4.3 MMOL/L (ref 3.4–5.3)
SODIUM SERPL-SCNC: 140 MMOL/L (ref 136–145)

## 2023-04-14 PROCEDURE — 250N000013 HC RX MED GY IP 250 OP 250 PS 637

## 2023-04-14 PROCEDURE — 80048 BASIC METABOLIC PNL TOTAL CA: CPT | Performed by: INTERNAL MEDICINE

## 2023-04-14 PROCEDURE — 250N000013 HC RX MED GY IP 250 OP 250 PS 637: Performed by: INTERNAL MEDICINE

## 2023-04-14 PROCEDURE — 99239 HOSP IP/OBS DSCHRG MGMT >30: CPT | Performed by: HOSPITALIST

## 2023-04-14 PROCEDURE — 85049 AUTOMATED PLATELET COUNT: CPT | Performed by: HOSPITALIST

## 2023-04-14 PROCEDURE — 36415 COLL VENOUS BLD VENIPUNCTURE: CPT | Performed by: INTERNAL MEDICINE

## 2023-04-14 RX ORDER — CEFDINIR 300 MG/1
300 CAPSULE ORAL EVERY 12 HOURS
Qty: 3 CAPSULE | Refills: 0 | Status: SHIPPED | OUTPATIENT
Start: 2023-04-14 | End: 2023-04-16

## 2023-04-14 RX ADMIN — ATENOLOL 50 MG: 50 TABLET ORAL at 08:25

## 2023-04-14 RX ADMIN — CEFDINIR 300 MG: 300 CAPSULE ORAL at 08:25

## 2023-04-14 RX ADMIN — SENNOSIDES AND DOCUSATE SODIUM 1 TABLET: 50; 8.6 TABLET ORAL at 08:25

## 2023-04-14 RX ADMIN — AMLODIPINE BESYLATE 10 MG: 10 TABLET ORAL at 08:25

## 2023-04-14 ASSESSMENT — ACTIVITIES OF DAILY LIVING (ADL)
ADLS_ACUITY_SCORE: 22

## 2023-04-14 NOTE — PROGRESS NOTES
Patient walked in jimenez for short distance on R/A. Oxygen saturation dropped to 83% for only less than 30seconds , and went to 89 to 92% on R/A at rest. Patient has mild occasional cough. Patient states feels much better than when admitted. Plan to discharge this afternoon.

## 2023-04-14 NOTE — DISCHARGE SUMMARY
[facility]  Discharge Summary - Hospital Medicine  Date of Admission:  4/11/2023; Date of Discharge:  4/14/2023      Primary Care     [facility]  [address] [facility] [address]      Hospital Course   Claudene Y Lundberg is a 83 year old female who presents on 4/11/2023 with cough. Admitted for treatment of pneumonia.      Pneumonia of lower lobe due to infectious organism, unspecified laterality  Acute hypoxemic respiratory failure  Sore throat and cough beginning 4/9, slowly worsening. Hypoxic on presentation, requiring 3L on admission to maintain SPO2 >90%. CXR shows left lower lobe infiltrate, likely infectious. Not septic (no leukocytosis, afebrile, no tachycardia).  Completed 3 days of high-dose azithromycin 500 mg here in the hospital as well as ceftriaxone.  Was able to wean to room air by day of discharge.  She does desaturate to the mid 80s with ambulation, but rapidly recovers to >90% and is adamant that she does not want to go on oxygen.  -Complete total of 5 days of antimicrobial therapy with cefdinir 300 twice daily; she will have 3 doses remaining after discharge  - Suggest recheck chest x-ray or chest CT scan as outpatient 4-6 weeks; initial x-ray read as follows   IMPRESSION: Large hiatal hernia. Left basilar linear and patchy   opacities could represent atelectasis and/or pneumonia. Indeterminate   patchy opacity projecting over the anterior mediastinum, best seen on   the lateral views, could also represent a infiltrate. A follow-up CT   after treatment is recommended to ensure resolution. No pleural   effusion or pneumothorax. Mild cardiomegaly. Tortuous aortic arch.   Thoracolumbar scoliosis.      Essential hypertension  Controlled since presentation.  Managed PTA with amlodipine 10 mg daily, atenolol 50 mg daily, furosemide 30 mg daily, lisinopril 40 mg daily. We held furosemide and lisinopril here, -140 systolic even with those held  question if perhaps she does have some whitecoat hypertension?  Regardless given that her systolics are up to 140 at times will resume her home regimen at discharge  --Can resume home regimen    Thrombocytopenia  Platelets 139 on admission, dropped down to 113 here before improving to 135.  Baseline from October was 195.  - Suggest recheck as outpatient later this spring.  And working up if not improving     Chronic kidney disease, stage III (moderate)  Near baseline. Baseline creatinine around 0.8 - 0.9. Creatinine on presentation 1.01.  Improved down to 0.74 here     Pure hypercholesterolemia  Noted in history, not on any outpatient pharmacologic management.  Monitor.  Follow-up with primary care for ongoing surveillance and management.    Code Status:   DNR / DNI    Incidental findings for which additional follow-up may be required:  none    Pending Results   Unresulted Labs Ordered in the Past 30 Days of this Admission     Date and Time Order Name Status Description    4/14/2023  8:41 AM Platelet count In process           Discharge Diagnoses     Pneumonia of lower lobe due to infectious organism, unspecified laterality    Chronic kidney disease, stage III (moderate) (H)    Essential hypertension    Pure hypercholesterolemia    Cough, unspecified type    * No resolved hospital problems. *      Discharge Disposition   Discharged to home    Discharge Orders      Reason for your hospital stay    Pneumonia. Your lungs seem to be healing slowly. Suggest you take it easy the next few days.     Suggest a repeat x ray in 4-6 weeks     Follow-up and recommended labs and tests     Follow up with primary care provider, [facility], within 7 days for hospital follow- up.  No follow up labs or test are needed at this time but should have a CXR in 4-6 weeks.     Activity    Your activity upon discharge: activity as tolerated     Diet    Follow this diet upon discharge: regular diet         Discharge Medications    Current Discharge Medication List      START taking these medications    Details   cefdinir (OMNICEF) 300 MG capsule Take 1 capsule (300 mg) by mouth every 12 hours for 3 doses  Qty: 3 capsule, Refills: 0    Associated Diagnoses: Pneumonia of lower lobe due to infectious organism, unspecified laterality         CONTINUE these medications which have NOT CHANGED    Details   amLODIPine (NORVASC) 10 MG tablet Take 1 tablet by mouth daily      atenolol (TENORMIN) 50 MG tablet Take 50 mg by mouth daily      furosemide (LASIX) 20 MG tablet Take 30 mg by mouth daily      lisinopril (ZESTRIL) 40 MG tablet Take 40 mg by mouth daily           Allergies   No Known Allergies    Consultations This Hospital Stay   No consultations were requested during this admission    Significant Results and Procedures     Physical Exam   Temp:  [97.4  F (36.3  C)-98.2  F (36.8  C)] 97.4  F (36.3  C)  Pulse:  [62-72] 62  Resp:  [15-18] 15  BP: (115-140)/(67-83) 131/83  SpO2:  [91 %-93 %] 91 %  Vitals:    04/11/23 0737 04/11/23 1400 04/12/23 0359   Weight: 72.6 kg (160 lb) 72.5 kg (159 lb 13.3 oz) 74.9 kg (165 lb 2 oz)       Constitutional: well appearing: Ambulates briskly around the room without any dyspnea  Lungs clear bilaterally    The discharge plan was discussed with the patient.     IIvan MD, personally saw the patient today and spent greater than 30 minutes discharging this patient.    Ivan Rhodes MD

## 2023-04-14 NOTE — PROGRESS NOTES
[facility] DISCHARGE NOTE    Patient discharged to home at 1030 AM via  [facility]. Accompanied by  friend and staff. Discharge instructions reviewed with  patient, opportunity offered to ask questions. Prescriptions  To  at pharmacy. All belongings sent with patient.    Brigette Emery RN

## 2023-04-14 NOTE — PLAN OF CARE
Neuro: A&O, up independently in room  CV: WNL  Pulm: 1L NC, oxygen saturations remains between 90-94% after activity   GI: Regular diet  : Voids in bathroom  IV: None  Skin: WNL  Pain: No complaints    Plan: Wean O2 as patient tolerates

## 2023-04-16 ENCOUNTER — PATIENT OUTREACH (OUTPATIENT)
Dept: CARE COORDINATION | Facility: CLINIC | Age: 83
End: 2023-04-16
Payer: COMMERCIAL

## 2023-04-16 NOTE — PROGRESS NOTES
Clinic Care Coordination Contact  [facility]: Post-Discharge Note  SITUATION                                                      Admission:    Admission Date: 04/11/23   Reason for Admission: Pneumonia  Discharge:   Discharge Date: 04/14/23  Discharge Diagnosis: Pneumonia of lower lobe due to infectious organism, unspecified laterality  Acute hypoxemic respiratory failure    BACKGROUND                                                      Per hospital discharge summary and inpatient provider notes:  Claudene Y Lundberg is a 83 year old female with past medical history of chronic kidney disease, hypertension now presents on 4/11/2023 with cough.     Patient had sore throat beginning early Sunday 4/9.  Later during day 4/9 patient developed productive cough of white to green sputum.  Cough has progressively worsened with episodes of dyspnea.  Dyspnea is not positional or exertional, but is associated with coughing fits.  Patient occasionally is coughing so hard that she gags.  No vomiting.  No aspirating.  No chest pain with coughing.  No pleuritic chest pain with deep breathing. Denies fevers or chills. A.m. 4/11 patient began noticing small streaks of blood in sputum.  Presented to the emergency department for persistent worsening cough.     Upon arrival to the emergency department patient received lab and imaging work-up.  She received 1 g ceftriaxone and 500 mg azithromycin IV.     Patient is not immunocompromised and does not have a history of chronic lung disease.  She was around family on Easter weekend, including around 3-year-old grandson.  Patient says none of the people she was with this past weekend have gotten sick.    ASSESSMENT           Discharge Assessment  How are you doing now that you are home?: great getting better  How are your symptoms? (Red Flag symptoms escalate to triage hotline per guidelines): Improved  Do you feel your condition is stable enough to be safe at home until your  provider visit?: Yes  Does the patient have their discharge instructions? : Yes  Does the patient have questions regarding their discharge instructions? : No  Were you started on any new medications or were there changes to any of your previous medications? : Yes  Does the patient have all of their medications?: Yes  Do you have questions regarding any of your medications? : No  Do you have all of your needed medical supplies or equipment (DME)?  (i.e. oxygen tank, CPAP, cane, etc.): Yes  Discharge follow-up appointment scheduled within 14 calendar days? : Yes                  PLAN                                                      Outpatient Plan:     [facility] hospital follow- up.  No follow up labs or test are needed at this time but should have a CXR in 4-6 weeks.         No future appointments.      For any urgent concerns, please contact our 24 hour nurse triage line: [phone] ([phone])         Ilsa Macdonald MA

## 2023-09-18 ENCOUNTER — HOSPITAL ENCOUNTER (EMERGENCY)
Facility: CLINIC | Age: 83
Discharge: HOME | End: 2023-09-18
Attending: PHYSICIAN ASSISTANT | Admitting: PHYSICIAN ASSISTANT
Payer: COMMERCIAL

## 2023-09-18 VITALS
SYSTOLIC BLOOD PRESSURE: 136 MMHG | HEART RATE: 71 BPM | DIASTOLIC BLOOD PRESSURE: 68 MMHG | OXYGEN SATURATION: 94 % | TEMPERATURE: 99.4 F | RESPIRATION RATE: 16 BRPM

## 2023-09-18 DIAGNOSIS — M79.89 SWELLING OF RIGHT FOOT: ICD-10-CM

## 2023-09-18 PROCEDURE — 99213 OFFICE O/P EST LOW 20 MIN: CPT | Performed by: PHYSICIAN ASSISTANT

## 2023-09-18 PROCEDURE — G0463 HOSPITAL OUTPT CLINIC VISIT: HCPCS | Performed by: PHYSICIAN ASSISTANT

## 2023-09-18 RX ORDER — METHYLPREDNISOLONE 4 MG
TABLET, DOSE PACK ORAL
Qty: 21 TABLET | Refills: 0 | Status: ON HOLD | OUTPATIENT
Start: 2023-09-18 | End: 2024-01-12

## 2023-09-18 ASSESSMENT — ACTIVITIES OF DAILY LIVING (ADL): ADLS_ACUITY_SCORE: 35

## 2023-09-18 NOTE — ED PROVIDER NOTES
History     Chief Complaint   Patient presents with    Foot Swelling     Right foot. Painful. Swollen. Patient would like medicine like in the past.      HPI  Claudene Y Lundberg is a 83 year old female who presents to urgent care with concern for right foot pain, swelling just present for the last 10 days.  Patient denies any instigating injury or trauma.  She reports that she had similar symptoms approximately 1 year ago evaluated in the emergency department had multiple evaluations including ultrasound, MRI and was told possibly gout.  She was treated with steroid taper with improvement.  MRI report that day showed.  Moderate to large first MTP joint effusion with first metatarsal  head/erosion/marrow abnormality. In an appropriate clinical setting  this may be consistent with septic arthritis with osteomyelitis.   a. Regionally, apparent plantar soft tissue defect/loss at the level  of the great toe proximal phalanx, if this is indeed area of ulcer,  despite lack of clear communication to the joint, infection becomes  high in differential. If there is no open ulcer, other inflammatory  arthritis as well as crystal deposition disease like gout are in  differential. Please correlate clinically especially given the  relative loss of signal/artifact partially compromising assessment of  the area. She did not follow up with podiatry following that visit as was recommended.  She contacted primary care provider requesting refill of medication however were unable to refill as it is not on the protocol.  She has pain, erythema, swelling along her first MTP joint, diffuse swelling of the foot.  She denies any distal numbness or paresthesias.  No fever, chills, nausea, cough, dyspnea, wheezing or distal numbness or paresthesias.      Allergies:  No Known Allergies    Problem List:    Patient Active Problem List    Diagnosis Date Noted    Pneumonia of lower lobe due to infectious organism, unspecified laterality  04/11/2023     Priority: Medium    Cough, unspecified type 04/11/2023     Priority: Medium    Chronic kidney disease, stage III (moderate) (H) 11/11/2009     Priority: Medium     Formatting of this note might be different from the original.  11/08 Creatinine 1.3  CREATININE                (MG/DL)   Date Value   11/11/09  1:50 PM 1.5*      Pure hypercholesterolemia 03/10/2008     Priority: Medium     3/08 Cholesterol 225 HDL 57  TG 90  TSH 1.26      Essential hypertension 03/07/2008     Priority: Medium        Past Medical History:    No past medical history on file.    Past Surgical History:    No past surgical history on file.    Family History:    No family history on file.    Social History:  Marital Status:   [2]  Social History     Tobacco Use    Smoking status: Never    Smokeless tobacco: Never        Medications:    amLODIPine (NORVASC) 10 MG tablet  atenolol (TENORMIN) 50 MG tablet  furosemide (LASIX) 20 MG tablet  lisinopril (ZESTRIL) 40 MG tablet  methylPREDNISolone (MEDROL DOSEPAK) 4 MG tablet therapy pack      Review of Systems  CONSTITUTIONAL:NEGATIVE for fever, chills, change in weight  INTEGUMENTARY/SKIN: POSITIVE for erythema right great foot near MTP joint   RESP:NEGATIVE for significant cough or SOB  MUSCULOSKELETAL: POSITIVE  for right foot pain, swelling and NEGATIVE for other concerning arthralgias or myalgias   NEURO: NEGATIVE for new onset numbness/paresthesias   Physical Exam   BP: 136/68  Pulse: 71  Temp: 99.4  F (37.4  C)  Resp: 16  SpO2: 94 %  Physical Exam  Constitutional:       General: She is not in acute distress.     Appearance: She is not ill-appearing or toxic-appearing.   HENT:      Head: Normocephalic and atraumatic.   Cardiovascular:      Pulses:           Dorsalis pedis pulses are 2+ on the right side.        Posterior tibial pulses are 2+ on the right side.   Musculoskeletal:      Right lower leg: No swelling. No edema.      Right ankle: No swelling, deformity,  ecchymosis or lacerations. Tenderness present. Normal range of motion.      Right foot: Decreased range of motion. Normal capillary refill. Swelling and tenderness present. No deformity or crepitus. Normal pulse.   Skin:     Findings: Erythema present. No abrasion, ecchymosis, laceration or wound.   Neurological:      Mental Status: She is alert.      Sensory: No sensory deficit.       ED Course                 Procedures              Critical Care time:  none               No results found for this or any previous visit (from the past 24 hour(s)).    Medications - No data to display    Assessments & Plan (with Medical Decision Making)     I have reviewed the nursing notes.    I have reviewed the findings, diagnosis, plan and need for follow up with the patient.         New Prescriptions    METHYLPREDNISOLONE (MEDROL DOSEPAK) 4 MG TABLET THERAPY PACK    Follow Package Directions       Final diagnoses:   Swelling of right foot     83-year-old female who had been previously diagnosed with suspected gout presents to the urgent care with recurrent over 10-day history of recurrence of her right foot pain, swelling.  She had a stable vital signs upon arrival.  Physical exam findings did show some erythema, tenderness palpation, swelling about the right first MTP joint however diffuse generalized swelling of the foot, distal neurovascular status was intact.  I did review records from prior ED visit 10/12/2022 including imaging performed at that time.  I discussed with patient that given her history, appearance I do suspect recurrent gout flare.  We discussed her/benefits of obtaining images with x-ray and patient elected today [facility] given absence of recent trauma.  I have low suspicion for septic arthritis, osteomyelitis and will defer further evaluation.  She was discharged home stable with Medrol Dosepak as she tolerated this well in the past.  Follow-up with podiatry for recheck if symptoms persist, worrisome reasons to  return to the ER/UC sooner discussed.     Disclaimer: This note consists of symbols derived from keyboarding, dictation, and/or voice recognition software. As a result, there may be errors in the script that have gone undetected.  Please consider this when interpreting information found in the chart.      9/18/2023   [facility] EMERGENCY DEPT       Madeline Zepeda PA-C  09/19/23 1146

## 2023-09-27 ENCOUNTER — OFFICE VISIT (OUTPATIENT)
Dept: PODIATRY | Facility: CLINIC | Age: 83
End: 2023-09-27
Payer: COMMERCIAL

## 2023-09-27 VITALS
HEART RATE: 76 BPM | BODY MASS INDEX: 26.66 KG/M2 | SYSTOLIC BLOOD PRESSURE: 116 MMHG | WEIGHT: 160 LBS | DIASTOLIC BLOOD PRESSURE: 75 MMHG | HEIGHT: 65 IN

## 2023-09-27 DIAGNOSIS — M10.071 ACUTE IDIOPATHIC GOUT OF RIGHT FOOT: Primary | ICD-10-CM

## 2023-09-27 LAB — URATE SERPL-MCNC: 7.8 MG/DL (ref 2.4–5.7)

## 2023-09-27 PROCEDURE — 84550 ASSAY OF BLOOD/URIC ACID: CPT | Performed by: PODIATRIST

## 2023-09-27 PROCEDURE — 36415 COLL VENOUS BLD VENIPUNCTURE: CPT | Performed by: PODIATRIST

## 2023-09-27 PROCEDURE — 99203 OFFICE O/P NEW LOW 30 MIN: CPT | Performed by: PODIATRIST

## 2023-09-27 ASSESSMENT — PAIN SCALES - GENERAL: PAINLEVEL: MODERATE PAIN (4)

## 2023-09-27 NOTE — LETTER
"    9/27/2023         RE: Claudene Y Lundberg  [address] [facility] [place] [id number]        Dear Colleague,    Thank you for referring your patient, Claudene Y Lundberg, to the [facility]. Please see a copy of my visit note below.    PATIENT HISTORY:  Claudene Y Lundberg is a 83 year old female who presents to clinic as an ER referral with a chief complaint of swelling of right foot.  The patient is seen by themselves.  The patient relates the pain is primarily located around the toes and top of her foot.  Reports insidious onset without acute precipitating event.  The patient relates that the symptoms have been going on for 8 day(s).  The patient has previously tried different shoes, oral prednisone with little relief.  The patient is retired.  Any previous notes and studies that pertain to the patient's condition were reviewed. Patient has had this swelling occur before bilaterally.     Pertinent medical, surgical and family history was reviewed in the [facility] chart.    Past Medical History: History reviewed. No pertinent past medical history.    Medications:   Current Outpatient Medications:      amLODIPine (NORVASC) 10 MG tablet, Take 1 tablet by mouth daily, Disp: , Rfl:      atenolol (TENORMIN) 50 MG tablet, Take 50 mg by mouth daily, Disp: , Rfl:      furosemide (LASIX) 20 MG tablet, Take 30 mg by mouth daily, Disp: , Rfl:      lisinopril (ZESTRIL) 40 MG tablet, Take 40 mg by mouth daily, Disp: , Rfl:      methylPREDNISolone (MEDROL DOSEPAK) 4 MG tablet therapy pack, Follow Package Directions, Disp: 21 tablet, Rfl: 0     Allergies:  No Known Allergies    Vitals: /75   Pulse 76   Ht 1.651 m (5' 5\")   Wt 72.6 kg (160 lb)   BMI 26.63 kg/m    BMI= Body mass index is 26.63 kg/m .    LOWER EXTREMITY PHYSICAL EXAM    Dermatologic: Skin is intact to right lower extremity without significant lesions, rash or abrasion.        Vascular: DP & PT pulses are intact & " regular on the right.   CFT and skin temperature is normal to the right lower extremity.     Neurologic: Lower extremity sensation is intact to light touch.  No evidence of weakness in the right lower extremity.        Musculoskeletal: Patient is ambulatory without assistive device or brace.  No gross ankle deformity noted.  No foot or ankle joint effusion is noted.  Noted mild to moderate edema over the first metatarsophalangeal joint on the right.           Labs:    Component    Ref Range & Units     1:45 PM    11 mo ago       Uric Acid 2.4 - 5.7 mg/dL 7.8 High  6.5 High        ASSESSMENT / PLAN:     ICD-10-CM    1. Acute idiopathic gout of right foot  M10.071 Uric acid     Uric acid          I have explained to Claudene about the conditions.  We discussed the underlying contributing factors to the condition as well as both conservative and surgical treatment options along with expected length of recovery.  At this time, I recommended that she follow up with her primary care provider for further treatment options to control the elevated uric acid and prevent future gout flare-ups.      Claudene verbalized agreement with and understanding of the rational for the diagnosis and treatment plan.  All questions were answered to best of my ability and the patient's satisfaction. The patient was advised to contact the clinic with any questions that may arise after the clinic visit.      Disclaimer: This note consists of symbols derived from keyboarding, dictation and/or voice recognition software. As a result, there may be errors in the script that have gone undetected. Please consider this when interpreting information found in this chart.       SERGO Self D.P.M., F.SHAN.C.F.A.S.      Again, thank you for allowing me to participate in the care of your patient.        Sincerely,        Edgar Self DPM

## 2023-09-27 NOTE — PROGRESS NOTES
"PATIENT HISTORY:  Claudene Y Lundberg is a 83 year old female who presents to clinic as an ER referral with a chief complaint of swelling of right foot.  The patient is seen by themselves.  The patient relates the pain is primarily located around the toes and top of her foot.  Reports insidious onset without acute precipitating event.  The patient relates that the symptoms have been going on for 8 day(s).  The patient has previously tried different shoes, oral prednisone with little relief.  The patient is retired.  Any previous notes and studies that pertain to the patient's condition were reviewed. Patient has had this swelling occur before bilaterally.     Pertinent medical, surgical and family history was reviewed in the Epic chart.    Past Medical History: History reviewed. No pertinent past medical history.    Medications:   Current Outpatient Medications:     amLODIPine (NORVASC) 10 MG tablet, Take 1 tablet by mouth daily, Disp: , Rfl:     atenolol (TENORMIN) 50 MG tablet, Take 50 mg by mouth daily, Disp: , Rfl:     furosemide (LASIX) 20 MG tablet, Take 30 mg by mouth daily, Disp: , Rfl:     lisinopril (ZESTRIL) 40 MG tablet, Take 40 mg by mouth daily, Disp: , Rfl:     methylPREDNISolone (MEDROL DOSEPAK) 4 MG tablet therapy pack, Follow Package Directions, Disp: 21 tablet, Rfl: 0     Allergies:  No Known Allergies    Vitals: /75   Pulse 76   Ht 1.651 m (5' 5\")   Wt 72.6 kg (160 lb)   BMI 26.63 kg/m    BMI= Body mass index is 26.63 kg/m .    LOWER EXTREMITY PHYSICAL EXAM    Dermatologic: Skin is intact to right lower extremity without significant lesions, rash or abrasion.        Vascular: DP & PT pulses are intact & regular on the right.   CFT and skin temperature is normal to the right lower extremity.     Neurologic: Lower extremity sensation is intact to light touch.  No evidence of weakness in the right lower extremity.        Musculoskeletal: Patient is ambulatory without assistive device or " brace.  No gross ankle deformity noted.  No foot or ankle joint effusion is noted.  Noted mild to moderate edema over the first metatarsophalangeal joint on the right.           Labs:    Component    Ref Range & Units     1:45 PM    11 mo ago       Uric Acid 2.4 - 5.7 mg/dL 7.8 High  6.5 High        ASSESSMENT / PLAN:     ICD-10-CM    1. Acute idiopathic gout of right foot  M10.071 Uric acid     Uric acid          I have explained to Claudene about the conditions.  We discussed the underlying contributing factors to the condition as well as both conservative and surgical treatment options along with expected length of recovery.  At this time, I recommended that she follow up with her primary care provider for further treatment options to control the elevated uric acid and prevent future gout flare-ups.      Claudene verbalized agreement with and understanding of the rational for the diagnosis and treatment plan.  All questions were answered to best of my ability and the patient's satisfaction. The patient was advised to contact the clinic with any questions that may arise after the clinic visit.      Disclaimer: This note consists of symbols derived from keyboarding, dictation and/or voice recognition software. As a result, there may be errors in the script that have gone undetected. Please consider this when interpreting information found in this chart.       SERGO Self D.P.M., F.SHAN.C.F.A.S.

## 2023-09-28 NOTE — RESULT ENCOUNTER NOTE
Called and spoke with patient. She informed me that she believes that the stress of her  passing may be the cause of her uric acid levels going up. She is confused on what to eat as she hears it's okay to eat apples, carrots, ect.., then it's not . I informed her that her primary care doctor would go over everything with her.    Called patient with Uric acid results. Informed her that they where high. They went from 6.5 to 7.8  She will need to follow up with her primary care provider to come up with a treatment plan to help reduce her uric acid. No further questions    Alanis Loving MA on 9/28/2023 at 10:00 AM

## 2024-01-12 ENCOUNTER — APPOINTMENT (OUTPATIENT)
Dept: GENERAL RADIOLOGY | Facility: CLINIC | Age: 84
DRG: 189 | End: 2024-01-12
Attending: PHYSICIAN ASSISTANT
Payer: COMMERCIAL

## 2024-01-12 ENCOUNTER — HOSPITAL ENCOUNTER (INPATIENT)
Facility: CLINIC | Age: 84
LOS: 4 days | Discharge: HOME | DRG: 189 | End: 2024-01-17
Attending: EMERGENCY MEDICINE | Admitting: STUDENT IN AN ORGANIZED HEALTH CARE EDUCATION/TRAINING PROGRAM
Payer: COMMERCIAL

## 2024-01-12 DIAGNOSIS — R93.89 ABNORMAL CHEST X-RAY: ICD-10-CM

## 2024-01-12 DIAGNOSIS — R05.9 COUGH, UNSPECIFIED TYPE: Primary | ICD-10-CM

## 2024-01-12 DIAGNOSIS — J18.9 PNEUMONIA OF LOWER LOBE DUE TO INFECTIOUS ORGANISM, UNSPECIFIED LATERALITY: ICD-10-CM

## 2024-01-12 DIAGNOSIS — J96.01 ACUTE RESPIRATORY FAILURE WITH HYPOXIA (H): ICD-10-CM

## 2024-01-12 DIAGNOSIS — J20.5 ACUTE BRONCHITIS DUE TO RESPIRATORY SYNCYTIAL VIRUS (RSV): ICD-10-CM

## 2024-01-12 LAB
ANION GAP SERPL CALCULATED.3IONS-SCNC: 12 MMOL/L (ref 7–15)
BASE EXCESS BLDV CALC-SCNC: 3.7 MMOL/L (ref -7.7–1.9)
BASOPHILS # BLD AUTO: ABNORMAL 10*3/UL
BASOPHILS # BLD MANUAL: 0 10E3/UL (ref 0–0.2)
BASOPHILS NFR BLD AUTO: ABNORMAL %
BASOPHILS NFR BLD MANUAL: 0 %
BUN SERPL-MCNC: 22.1 MG/DL (ref 8–23)
CALCIUM SERPL-MCNC: 9.7 MG/DL (ref 8.8–10.2)
CHLORIDE SERPL-SCNC: 99 MMOL/L (ref 98–107)
CREAT SERPL-MCNC: 0.91 MG/DL (ref 0.51–0.95)
DEPRECATED HCO3 PLAS-SCNC: 25 MMOL/L (ref 22–29)
EGFRCR SERPLBLD CKD-EPI 2021: 63 ML/MIN/1.73M2
EOSINOPHIL # BLD AUTO: ABNORMAL 10*3/UL
EOSINOPHIL # BLD MANUAL: 0.1 10E3/UL (ref 0–0.7)
EOSINOPHIL NFR BLD AUTO: ABNORMAL %
EOSINOPHIL NFR BLD MANUAL: 1 %
ERYTHROCYTE [DISTWIDTH] IN BLOOD BY AUTOMATED COUNT: 13.8 % (ref 10–15)
FLUAV RNA SPEC QL NAA+PROBE: NEGATIVE
FLUBV RNA RESP QL NAA+PROBE: NEGATIVE
GLUCOSE SERPL-MCNC: 126 MG/DL (ref 70–99)
HCO3 BLDV-SCNC: 31 MMOL/L (ref 21–28)
HCT VFR BLD AUTO: 40.6 % (ref 35–47)
HGB BLD-MCNC: 12.8 G/DL (ref 11.7–15.7)
IMM GRANULOCYTES # BLD: ABNORMAL 10*3/UL
IMM GRANULOCYTES NFR BLD: ABNORMAL %
LYMPHOCYTES # BLD AUTO: ABNORMAL 10*3/UL
LYMPHOCYTES # BLD MANUAL: 0.4 10E3/UL (ref 0.8–5.3)
LYMPHOCYTES NFR BLD AUTO: ABNORMAL %
LYMPHOCYTES NFR BLD MANUAL: 8 %
MCH RBC QN AUTO: 30.2 PG (ref 26.5–33)
MCHC RBC AUTO-ENTMCNC: 31.5 G/DL (ref 31.5–36.5)
MCV RBC AUTO: 96 FL (ref 78–100)
MONOCYTES # BLD AUTO: ABNORMAL 10*3/UL
MONOCYTES # BLD MANUAL: 0.5 10E3/UL (ref 0–1.3)
MONOCYTES NFR BLD AUTO: ABNORMAL %
MONOCYTES NFR BLD MANUAL: 10 %
NEUTROPHILS # BLD AUTO: ABNORMAL 10*3/UL
NEUTROPHILS # BLD MANUAL: 4.3 10E3/UL (ref 1.6–8.3)
NEUTROPHILS NFR BLD AUTO: ABNORMAL %
NEUTROPHILS NFR BLD MANUAL: 81 %
NRBC # BLD AUTO: 0 10E3/UL
NRBC BLD AUTO-RTO: 0 /100
O2/TOTAL GAS SETTING VFR VENT: 21 %
PCO2 BLDV: 59 MM HG (ref 40–50)
PH BLDV: 7.33 [PH] (ref 7.32–7.43)
PLAT MORPH BLD: ABNORMAL
PLATELET # BLD AUTO: 143 10E3/UL (ref 150–450)
PO2 BLDV: 14 MM HG (ref 25–47)
POTASSIUM SERPL-SCNC: 4.7 MMOL/L (ref 3.4–5.3)
RBC # BLD AUTO: 4.24 10E6/UL (ref 3.8–5.2)
RBC MORPH BLD: ABNORMAL
RSV RNA SPEC NAA+PROBE: POSITIVE
SARS-COV-2 RNA RESP QL NAA+PROBE: NEGATIVE
SODIUM SERPL-SCNC: 136 MMOL/L (ref 135–145)
WBC # BLD AUTO: 5.3 10E3/UL (ref 4–11)

## 2024-01-12 PROCEDURE — 85027 COMPLETE CBC AUTOMATED: CPT | Performed by: EMERGENCY MEDICINE

## 2024-01-12 PROCEDURE — 36415 COLL VENOUS BLD VENIPUNCTURE: CPT | Performed by: EMERGENCY MEDICINE

## 2024-01-12 PROCEDURE — 250N000012 HC RX MED GY IP 250 OP 636 PS 637: Performed by: EMERGENCY MEDICINE

## 2024-01-12 PROCEDURE — 99222 1ST HOSP IP/OBS MODERATE 55: CPT

## 2024-01-12 PROCEDURE — 94640 AIRWAY INHALATION TREATMENT: CPT

## 2024-01-12 PROCEDURE — 250N000009 HC RX 250

## 2024-01-12 PROCEDURE — 250N000009 HC RX 250: Performed by: EMERGENCY MEDICINE

## 2024-01-12 PROCEDURE — 85007 BL SMEAR W/DIFF WBC COUNT: CPT | Performed by: EMERGENCY MEDICINE

## 2024-01-12 PROCEDURE — 80048 BASIC METABOLIC PNL TOTAL CA: CPT | Performed by: EMERGENCY MEDICINE

## 2024-01-12 PROCEDURE — 99285 EMERGENCY DEPT VISIT HI MDM: CPT | Mod: 25

## 2024-01-12 PROCEDURE — G0378 HOSPITAL OBSERVATION PER HR: HCPCS

## 2024-01-12 PROCEDURE — 82803 BLOOD GASES ANY COMBINATION: CPT | Performed by: EMERGENCY MEDICINE

## 2024-01-12 PROCEDURE — 99285 EMERGENCY DEPT VISIT HI MDM: CPT | Mod: 25 | Performed by: EMERGENCY MEDICINE

## 2024-01-12 PROCEDURE — 71046 X-RAY EXAM CHEST 2 VIEWS: CPT

## 2024-01-12 PROCEDURE — 87637 SARSCOV2&INF A&B&RSV AMP PRB: CPT | Performed by: NURSE PRACTITIONER

## 2024-01-12 PROCEDURE — 999N000157 HC STATISTIC RCP TIME EA 10 MIN

## 2024-01-12 RX ORDER — AMOXICILLIN 250 MG
1 CAPSULE ORAL 2 TIMES DAILY PRN
Status: DISCONTINUED | OUTPATIENT
Start: 2024-01-12 | End: 2024-01-17 | Stop reason: HOSPADM

## 2024-01-12 RX ORDER — ALBUTEROL SULFATE 0.83 MG/ML
2.5 SOLUTION RESPIRATORY (INHALATION)
Status: COMPLETED | OUTPATIENT
Start: 2024-01-12 | End: 2024-01-12

## 2024-01-12 RX ORDER — CALCIUM CARBONATE 500 MG/1
1000 TABLET, CHEWABLE ORAL 4 TIMES DAILY PRN
Status: DISCONTINUED | OUTPATIENT
Start: 2024-01-12 | End: 2024-01-17 | Stop reason: HOSPADM

## 2024-01-12 RX ORDER — ONDANSETRON 2 MG/ML
4 INJECTION INTRAMUSCULAR; INTRAVENOUS EVERY 6 HOURS PRN
Status: DISCONTINUED | OUTPATIENT
Start: 2024-01-12 | End: 2024-01-17 | Stop reason: HOSPADM

## 2024-01-12 RX ORDER — AMOXICILLIN 250 MG
2 CAPSULE ORAL 2 TIMES DAILY PRN
Status: DISCONTINUED | OUTPATIENT
Start: 2024-01-12 | End: 2024-01-17 | Stop reason: HOSPADM

## 2024-01-12 RX ORDER — ATENOLOL 50 MG/1
50 TABLET ORAL DAILY
Status: DISCONTINUED | OUTPATIENT
Start: 2024-01-13 | End: 2024-01-17 | Stop reason: HOSPADM

## 2024-01-12 RX ORDER — LISINOPRIL 40 MG/1
40 TABLET ORAL DAILY
Status: DISCONTINUED | OUTPATIENT
Start: 2024-01-13 | End: 2024-01-15

## 2024-01-12 RX ORDER — AMLODIPINE BESYLATE 10 MG/1
10 TABLET ORAL DAILY
Status: DISCONTINUED | OUTPATIENT
Start: 2024-01-13 | End: 2024-01-15

## 2024-01-12 RX ORDER — ONDANSETRON 4 MG/1
4 TABLET, ORALLY DISINTEGRATING ORAL EVERY 6 HOURS PRN
Status: DISCONTINUED | OUTPATIENT
Start: 2024-01-12 | End: 2024-01-17 | Stop reason: HOSPADM

## 2024-01-12 RX ORDER — PREDNISONE 20 MG/1
40 TABLET ORAL ONCE
Status: COMPLETED | OUTPATIENT
Start: 2024-01-12 | End: 2024-01-12

## 2024-01-12 RX ORDER — IPRATROPIUM BROMIDE AND ALBUTEROL SULFATE 2.5; .5 MG/3ML; MG/3ML
3 SOLUTION RESPIRATORY (INHALATION)
Status: DISCONTINUED | OUTPATIENT
Start: 2024-01-12 | End: 2024-01-14

## 2024-01-12 RX ORDER — ACETAMINOPHEN 325 MG/1
650 TABLET ORAL EVERY 4 HOURS PRN
Status: DISCONTINUED | OUTPATIENT
Start: 2024-01-12 | End: 2024-01-17 | Stop reason: HOSPADM

## 2024-01-12 RX ADMIN — IPRATROPIUM BROMIDE AND ALBUTEROL SULFATE 3 ML: 2.5; .5 SOLUTION RESPIRATORY (INHALATION) at 21:15

## 2024-01-12 RX ADMIN — ALBUTEROL SULFATE 2.5 MG: 2.5 SOLUTION RESPIRATORY (INHALATION) at 13:43

## 2024-01-12 RX ADMIN — PREDNISONE 40 MG: 20 TABLET ORAL at 13:42

## 2024-01-12 ASSESSMENT — ACTIVITIES OF DAILY LIVING (ADL)
ADLS_ACUITY_SCORE: 22
ADLS_ACUITY_SCORE: 35
ADLS_ACUITY_SCORE: 22
ADLS_ACUITY_SCORE: 22
ADLS_ACUITY_SCORE: 35
ADLS_ACUITY_SCORE: 35

## 2024-01-12 ASSESSMENT — ENCOUNTER SYMPTOMS
GASTROINTESTINAL NEGATIVE: 1
ENDOCRINE NEGATIVE: 1
DIARRHEA: 0
ALLERGIC/IMMUNOLOGIC NEGATIVE: 1
WHEEZING: 1
CARDIOVASCULAR NEGATIVE: 1
NAUSEA: 0
ORTHOPNEA: 1
CHILLS: 0
DYSURIA: 0
NEUROLOGICAL NEGATIVE: 1
MUSCULOSKELETAL NEGATIVE: 1
HEADACHES: 0
PALPITATIONS: 0
WEAKNESS: 0
SHORTNESS OF BREATH: 1
ABDOMINAL PAIN: 0
FEVER: 0
COUGH: 1
HEMATOLOGIC/LYMPHATIC NEGATIVE: 1
PSYCHIATRIC NEGATIVE: 1
VOMITING: 0
CONSTITUTIONAL NEGATIVE: 1
SPUTUM PRODUCTION: 1

## 2024-01-12 NOTE — ED PROVIDER NOTES
History   Cough, shortness of breath    HPI Claudene Y Lundberg is a 83 year old female who presents for evaluation with cough shortness of breath and concern about pneumonia.    Patient's medical records show history of hypertension high cholesterol and stage III chronic kidney disease.  Patient's current prescribed medications were reviewed.    Patient was captured to be hypoxic in urgent care 82% on room air and referred to the emergency department for further care    On examination patient reports she has heard a rattle in her chest and has been coughing/short of breath for a few days.  She is not typically oxygen dependent.  She is on furosemide, atenolol, amlodipine, and lisinopril.  She has been compliant with medications.  She reports no chills no orthopnea no dyspnea on exertion no new lower extremity swelling.     Allergies:  No Known Allergies    Problem List:    Patient Active Problem List    Diagnosis Date Noted    Abnormal chest x-ray 01/12/2024     Priority: Medium    Acute respiratory failure with hypoxia (H) 01/12/2024     Priority: Medium    Acute bronchitis due to respiratory syncytial virus (RSV) 01/12/2024     Priority: Medium    Pneumonia of lower lobe due to infectious organism, unspecified laterality 04/11/2023     Priority: Medium    Cough, unspecified type 04/11/2023     Priority: Medium    Chronic kidney disease, stage III (moderate) (H) 11/11/2009     Priority: Medium     Formatting of this note might be different from the original.  11/08 Creatinine 1.3  CREATININE                (MG/DL)   Date Value   11/11/09  1:50 PM 1.5*      Pure hypercholesterolemia 03/10/2008     Priority: Medium     3/08 Cholesterol 225 HDL 57  TG 90  TSH 1.26      Essential hypertension 03/07/2008     Priority: Medium        Past Medical History:    No past medical history on file.    Past Surgical History:    No past surgical history on file.    Family History:    No family history on file.    Social  History:  Marital Status:   [2]  Social History     Tobacco Use    Smoking status: Never    Smokeless tobacco: Never        Medications:    amLODIPine (NORVASC) 10 MG tablet  atenolol (TENORMIN) 50 MG tablet  furosemide (LASIX) 20 MG tablet  lisinopril (ZESTRIL) 40 MG tablet  methylPREDNISolone (MEDROL DOSEPAK) 4 MG tablet therapy pack          Review of Systems   Constitutional: Negative.    HENT: Negative.     Respiratory:  Positive for cough and shortness of breath.    Cardiovascular: Negative.    Gastrointestinal: Negative.    Endocrine: Negative.    Genitourinary: Negative.    Musculoskeletal: Negative.    Skin: Negative.    Allergic/Immunologic: Negative.    Neurological: Negative.    Hematological: Negative.    Psychiatric/Behavioral: Negative.     All other systems reviewed and are negative.      Physical Exam   BP: (!) 144/69  Pulse: 79  Temp: 98.6  F (37  C)  Resp: 16  SpO2: (!) 82 %      Physical Exam  Constitutional:       General: She is not in acute distress.     Appearance: She is ill-appearing. She is not toxic-appearing.   HENT:      Head: Normocephalic and atraumatic.      Right Ear: Tympanic membrane normal.      Nose: No rhinorrhea.      Mouth/Throat:      Mouth: Mucous membranes are moist.   Eyes:      Extraocular Movements: Extraocular movements intact.      Pupils: Pupils are equal, round, and reactive to light.   Cardiovascular:      Rate and Rhythm: Normal rate and regular rhythm.      Pulses: Normal pulses.   Pulmonary:      Effort: Pulmonary effort is normal. No respiratory distress.      Breath sounds: No stridor. Rhonchi present. No wheezing or rales.   Chest:      Chest wall: No tenderness.   Musculoskeletal:         General: No swelling, tenderness, deformity or signs of injury.      Cervical back: Normal range of motion and neck supple.      Right lower leg: No edema.      Left lower leg: No edema.   Skin:     Capillary Refill: Capillary refill takes less than 2 seconds.       "Coloration: Skin is not jaundiced or pale.      Findings: No bruising, erythema, lesion or rash.   Neurological:      General: No focal deficit present.      Mental Status: She is alert and oriented to person, place, and time.      Cranial Nerves: No cranial nerve deficit.      Sensory: No sensory deficit.      Motor: No weakness.      Coordination: Coordination normal.      Gait: Gait normal.      Deep Tendon Reflexes: Reflexes normal.   Psychiatric:         Mood and Affect: Mood normal.         Behavior: Behavior normal.         Thought Content: Thought content normal.         Judgment: Judgment normal.         ED Course                 Procedures              Critical Care time:  none             ED medications:  Medications   predniSONE (DELTASONE) tablet 40 mg (40 mg Oral $Given 1/12/24 1342)   albuterol (PROVENTIL) neb solution 2.5 mg (2.5 mg Nebulization $Given 1/12/24 1343)     ED vitals;  Vitals:    01/12/24 1328 01/12/24 1416 01/12/24 1452 01/12/24 1503   BP:       Pulse:       Resp:       Temp:       TempSrc:       SpO2: 90% 96% (!) 78% 94%     Vitals:    01/12/24 1328 01/12/24 1416 01/12/24 1452 01/12/24 1503   BP:       Pulse:       Resp:       Temp:       TempSrc:       SpO2: 90% 96% (!) 78% 94%       Vitals:    01/12/24 1328 01/12/24 1416 01/12/24 1452 01/12/24 1503   BP:       Pulse:       Resp:       Temp:       TempSrc:       SpO2: 90% 96% (!) 78% 94%      Vitals:    01/12/24 1600 01/12/24 1630 01/12/24 1700 01/12/24 1807   BP:    102/56   BP Location:    Left leg   Pulse:    65   Resp:    18   Temp:    98.8  F (37.1  C)   TempSrc:    Oral   SpO2: 94% 94% 92% 93%   Weight:    72 kg (158 lb 11.7 oz)   Height:    1.651 m (5' 5\")      ED labs and imaging:  Results for orders placed or performed during the hospital encounter of 01/12/24   Chest XR,  PA & LAT     Status: None    Narrative    CHEST TWO VIEWS  1/12/2024 1:01 PM     HISTORY: Shortness of breath and cough.    COMPARISON: 4/11/2023.      " Impression    IMPRESSION: Large hiatal hernia appears increased in distention. No  acute airspace disease. Normal cardiac silhouette.    JESSICA ARRINGTON MD         SYSTEM ID:  [id number]   Symptomatic Influenza A/B, RSV, & SARS-CoV2 PCR (COVID-19) Nose     Status: Abnormal    Specimen: Nose; Swab   Result Value Ref Range    Influenza A PCR Negative Negative    Influenza B PCR Negative Negative    RSV PCR Positive (A) Negative    SARS CoV2 PCR Negative Negative    Narrative    Testing was performed using the Xpert Xpress CoV2/Flu/RSV Assay on the [employer] GeneXpert Instrument. This test should be ordered for the detection of SARS-CoV-2, influenza, and RSV viruses in individuals who meet clinical and/or epidemiological criteria. Test performance is unknown in asymptomatic patients. This test is for in vitro diagnostic use under the FDA EUA for laboratories certified under CLIA to perform high or moderate complexity testing. This test has not been FDA cleared or approved. A negative result does not rule out the presence of PCR inhibitors in the specimen or target RNA in concentration below the limit of detection for the assay. If only one viral target is positive but coinfection with multiple targets is suspected, the sample should be re-tested with another FDA cleared, approved, or authorized test, if coinfection would change clinical management. This test was validated by the [facility]. These laboratories are certified under the Clinical Laboratory Improvement Amendments of 1988 (CLIA-88) as qualified to perform high complexity laboratory testing.   Blood gas venous     Status: Abnormal   Result Value Ref Range    pH Venous 7.33 7.32 - 7.43    pCO2 Venous 59 (H) 40 - 50 mm Hg    pO2 Venous 14 (L) 25 - 47 mm Hg    Bicarbonate Venous 31 (H) 21 - 28 mmol/L    Base Excess/Deficit 3.7 (H) -7.7 - 1.9 mmol/L    FIO2 21    Basic metabolic panel     Status: Abnormal   Result Value Ref Range    Sodium 136 135 -  145 mmol/L    Potassium 4.7 3.4 - 5.3 mmol/L    Chloride 99 98 - 107 mmol/L    Carbon Dioxide (CO2) 25 22 - 29 mmol/L    Anion Gap 12 7 - 15 mmol/L    Urea Nitrogen 22.1 8.0 - 23.0 mg/dL    Creatinine 0.91 0.51 - 0.95 mg/dL    GFR Estimate 63 >60 mL/min/1.73m2    Calcium 9.7 8.8 - 10.2 mg/dL    Glucose 126 (H) 70 - 99 mg/dL   CBC with platelets and differential     Status: Abnormal   Result Value Ref Range    WBC Count 5.3 4.0 - 11.0 10e3/uL    RBC Count 4.24 3.80 - 5.20 10e6/uL    Hemoglobin 12.8 11.7 - 15.7 g/dL    Hematocrit 40.6 35.0 - 47.0 %    MCV 96 78 - 100 fL    MCH 30.2 26.5 - 33.0 pg    MCHC 31.5 31.5 - 36.5 g/dL    RDW 13.8 10.0 - 15.0 %    Platelet Count 143 (L) 150 - 450 10e3/uL    % Neutrophils      % Lymphocytes      % Monocytes      % Eosinophils      % Basophils      % Immature Granulocytes      NRBCs per 100 WBC 0 <1 /100    Absolute Neutrophils      Absolute Lymphocytes      Absolute Monocytes      Absolute Eosinophils      Absolute Basophils      Absolute Immature Granulocytes      Absolute NRBCs 0.0 10e3/uL   Manual Differential     Status: Abnormal   Result Value Ref Range    % Neutrophils 81 %    % Lymphocytes 8 %    % Monocytes 10 %    % Eosinophils 1 %    % Basophils 0 %    Absolute Neutrophils 4.3 1.6 - 8.3 10e3/uL    Absolute Lymphocytes 0.4 (L) 0.8 - 5.3 10e3/uL    Absolute Monocytes 0.5 0.0 - 1.3 10e3/uL    Absolute Eosinophils 0.1 0.0 - 0.7 10e3/uL    Absolute Basophils 0.0 0.0 - 0.2 10e3/uL    RBC Morphology Confirmed RBC Indices     Platelet Assessment  Automated Count Confirmed. Platelet morphology is normal.     Automated Count Confirmed. Platelet morphology is normal.   CBC with platelets differential     Status: Abnormal    Narrative    The following orders were created for panel order CBC with platelets differential.  Procedure                               Abnormality         Status                     ---------                               -----------         ------                      CBC with platelets and d...[[id number]]  Abnormal            Final result               Manual Differential[[id number]]          Abnormal            Final result                 Please view results for these tests on the individual orders.     Assessments & Plan (with Medical Decision Making)   Assessment Summary and Clinical Impression: 83-year-old female was referred from urgent care with hypoxia after presenting cough and shortness of breath.  Patient was To be 82% on room air at urgent care.  She was afebrile blood pressure was 144/69.  Patient had persistent hypoxia during her ED course with saturation ranging from 85 to 88%.  She was placed on 2 L nasal cannula given bronchodilator therapy and steroids and observed.  She had no significant acidosis with compensation with mild hypercapnia.  Chest imaging revealed an interval enlargement of a large hiatal hernia but no definite evidence for pneumonia RSV test was positive.   she failed walking in the department without oxygen support.  She had severe hypoxia and dyspnea.  Patient agreed to be admitted for pulmonary toilet with new oxygen requirement.  Suspect her shortness of breath is likely multifactorial with RSV bronchitis, large hiatal hernia.  She is admitted to medicine for further care.    ED course and plan:  Reviewed the medical record.  She was To be hypoxic in the department.  She was placed on supplemental oxygen due to saturations of 82 to 80% on room air.  She was offered bronchodilator therapy and oral steroids.  With supplemental oxygen her oxygen saturation improved to  94% on 2L NC.  Patient's chest x-ray revealed what appears to be a large hiatal hernia with increased distention per radiology.  Last x-ray was 5/25/2023.  Patient reported she did not know she had a hiatal hernia .  Hemoglobin 12.8.  Normal electrolytes     After initial medications offered during her ED course patient was unable to ambulate without oxygen support.   After walking to and from the bathroom from her ED room her saturation dropped to 72% on room air.  Patient agreed to be admitted.    Spoke with MARIE Cannon PA-C-admitting provider at 3.18pm who accepted patient for admission with a working diagnosis of acute hypoxic respiratory likely multifactorial with RSV bronchitis and superimposed  large hiatal hernia.  History and exam did not raise concern for cardiac etiology for patient's symptoms and clinical suspicion for pulmonary embolism is low-not excluded without definitive imaging.          Disclaimer: This note consists of symbols derived from keyboarding, dictation and/or voice recognition software. As a result, there may be errors in the script that have gone undetected. Please consider this when interpreting information found in this chart.   I have reviewed the nursing notes.    I have reviewed the findings, diagnosis, plan and need for follow up with the patient.         Medical Decision Making  The patient's presentation was of moderate complexity (an acute illness with systemic symptoms).    The patient's evaluation involved:  ordering and/or review of 2 test(s) in this encounter (diagnostic imaging and labs)    The patient's management necessitated moderate risk (prescription drug management including medications given in the ED).        New Prescriptions    No medications on file       Final diagnoses:   Acute respiratory failure with hypoxia (H)   Acute bronchitis due to respiratory syncytial virus (RSV)   Abnormal chest x-ray -  Large hiatal hernia appears increased in distention.       1/12/2024   [facility] EMERGENCY DEPT       Stefano Garcias MD  01/12/24 [id number]

## 2024-01-12 NOTE — ED NOTES
Presents to the urgent care with shortness of breath, cough, and concerns for pneumonia.  At time of triage the highest we were able to get for pulse oximetry in the fingers was 82%.  She was wearing nail polish so I had the nurse check her toes and pulse ox was 98% on room air.  Patient was transferred over to the emergency department for further evaluation and management of shortness of breath.    PETROS Mccord Jenna L, PA-C  01/12/24 1563

## 2024-01-12 NOTE — ED NOTES
Pt up ambulating to BR on RA, SpO2 72% on RA upon returning to room. O2 applied at 3L with slow improvement. Provider at bedside to update on admission.

## 2024-01-12 NOTE — ED NOTES
[facility]   Admission Handoff    The patient is Claudene Y Lundberg, 83 year old who arrived in the ED by CAR from home with a complaint of Cough and Shortness of Breath  . The patient's current symptoms are new and during this time the symptoms have increased. In the ED, patient was diagnosed with   Final diagnoses:   Acute respiratory failure with hypoxia (H)   Acute bronchitis due to respiratory syncytial virus (RSV)   Abnormal chest x-ray -  Large hiatal hernia appears increased in distention.         Needed?: No    Allergies:  No Known Allergies    Past Medical Hx: No past medical history on file.    Initial vitals were: BP: (!) 144/69  Pulse: 79  Temp: 98.6  F (37  C)  Resp: 16  SpO2: (!) 82 %   Recent vital Signs: BP (!) 144/69   Pulse 79   Temp 98.6  F (37  C) (Oral)   Resp 16   SpO2 94%     Elimination Status: Continent: Yes     Activity Level: SBA    Fall Status: Reason for falls risk:  Mobility  nonskid shoes/slippers when out of bed    Baseline Mental status: WDL  Current Mental Status changes: at basesline    Infection present or suspected this encounter: yes respiratory  Sepsis suspected: No    Isolation type: respiratory    Bariatric equipment needed?: No    In the ED these meds were given:   Medications   predniSONE (DELTASONE) tablet 40 mg (40 mg Oral $Given 1/12/24 1342)   albuterol (PROVENTIL) neb solution 2.5 mg (2.5 mg Nebulization $Given 1/12/24 1343)       Drips running?  No    Home pump  No    Current LDAs: Peripheral IV: Site rt ac; Gauge 20  none     Results:   Labs/Imaging  Ordered and Resulted from Time of ED Arrival Up to the Time of Departure from the ED  Results for orders placed or performed during the hospital encounter of 01/12/24 (from the past 24 hour(s))   Symptomatic Influenza A/B, RSV, & SARS-CoV2 PCR (COVID-19) Nose    Specimen: Nose; Swab   Result Value Ref Range    Influenza A PCR Negative Negative    Influenza B PCR Negative  Negative    RSV PCR Positive (A) Negative    SARS CoV2 PCR Negative Negative    Narrative    Testing was performed using the Xpert Xpress CoV2/Flu/RSV Assay on the [employer] GeneXpert Instrument. This test should be ordered for the detection of SARS-CoV-2, influenza, and RSV viruses in individuals who meet clinical and/or epidemiological criteria. Test performance is unknown in asymptomatic patients. This test is for in vitro diagnostic use under the FDA EUA for laboratories certified under CLIA to perform high or moderate complexity testing. This test has not been FDA cleared or approved. A negative result does not rule out the presence of PCR inhibitors in the specimen or target RNA in concentration below the limit of detection for the assay. If only one viral target is positive but coinfection with multiple targets is suspected, the sample should be re-tested with another FDA cleared, approved, or authorized test, if coinfection would change clinical management. This test was validated by the [facility] [employer]. These laboratories are certified under the Clinical Laboratory Improvement Amendments of 1988 (CLIA-88) as qualified to perform high complexity laboratory testing.   Chest XR,  PA & LAT    Narrative    CHEST TWO VIEWS  1/12/2024 1:01 PM     HISTORY: Shortness of breath and cough.    COMPARISON: 4/11/2023.      Impression    IMPRESSION: Large hiatal hernia appears increased in distention. No  acute airspace disease. Normal cardiac silhouette.   Blood gas venous   Result Value Ref Range    pH Venous 7.33 7.32 - 7.43    pCO2 Venous 59 (H) 40 - 50 mm Hg    pO2 Venous 14 (L) 25 - 47 mm Hg    Bicarbonate Venous 31 (H) 21 - 28 mmol/L    Base Excess/Deficit 3.7 (H) -7.7 - 1.9 mmol/L    FIO2 21    CBC with platelets differential    Narrative    The following orders were created for panel order CBC with platelets differential.  Procedure                               Abnormality         Status                      ---------                               -----------         ------                     CBC with platelets and d...[[id number]]  Abnormal            Preliminary result           Please view results for these tests on the individual orders.   CBC with platelets and differential   Result Value Ref Range    WBC Count 5.3 4.0 - 11.0 10e3/uL    RBC Count 4.24 3.80 - 5.20 10e6/uL    Hemoglobin 12.8 11.7 - 15.7 g/dL    Hematocrit 40.6 35.0 - 47.0 %    MCV 96 78 - 100 fL    MCH 30.2 26.5 - 33.0 pg    MCHC 31.5 31.5 - 36.5 g/dL    RDW 13.8 10.0 - 15.0 %    Platelet Count 143 (L) 150 - 450 10e3/uL    % Neutrophils      % Lymphocytes      % Monocytes      % Eosinophils      % Basophils      % Immature Granulocytes      Absolute Neutrophils      Absolute Lymphocytes      Absolute Monocytes      Absolute Eosinophils      Absolute Basophils      Absolute Immature Granulocytes         For the majority of the shift this patient's behavior was Green     Cardiac Rhythm: N/A  Pt needs tele? No  Skin/wound Issues: None    Code Status: DNR / DNI    Pain control: pt had none    Nausea control: pt had none    Abnormal labs/tests/findings requiring intervention: RSV    Patient tested for COVID 19 prior to admission: YES     OBS brochure/video discussed/provided to patient/family: Yes     Family present during ED course? No     Family Comments/Social Situation comments: lives alone    Tasks needing completion: None    Sayra Tinajero RN

## 2024-01-12 NOTE — ED NOTES
Feeling  alittle better after neb tx. SpO2 readings sporatic, pt wearing red nail polish, reading 97% on toe prior to neb tx on 2L per NC .

## 2024-01-13 LAB
ANION GAP SERPL CALCULATED.3IONS-SCNC: 11 MMOL/L (ref 7–15)
BUN SERPL-MCNC: 29.4 MG/DL (ref 8–23)
CALCIUM SERPL-MCNC: 9.9 MG/DL (ref 8.8–10.2)
CHLORIDE SERPL-SCNC: 103 MMOL/L (ref 98–107)
CREAT SERPL-MCNC: 0.9 MG/DL (ref 0.51–0.95)
DEPRECATED HCO3 PLAS-SCNC: 25 MMOL/L (ref 22–29)
EGFRCR SERPLBLD CKD-EPI 2021: 64 ML/MIN/1.73M2
ERYTHROCYTE [DISTWIDTH] IN BLOOD BY AUTOMATED COUNT: 13.8 % (ref 10–15)
GLUCOSE SERPL-MCNC: 120 MG/DL (ref 70–99)
HCT VFR BLD AUTO: 37.8 % (ref 35–47)
HGB BLD-MCNC: 11.8 G/DL (ref 11.7–15.7)
MCH RBC QN AUTO: 29.4 PG (ref 26.5–33)
MCHC RBC AUTO-ENTMCNC: 31.2 G/DL (ref 31.5–36.5)
MCV RBC AUTO: 94 FL (ref 78–100)
PLATELET # BLD AUTO: 140 10E3/UL (ref 150–450)
POTASSIUM SERPL-SCNC: 5.1 MMOL/L (ref 3.4–5.3)
RBC # BLD AUTO: 4.01 10E6/UL (ref 3.8–5.2)
SODIUM SERPL-SCNC: 139 MMOL/L (ref 135–145)
WBC # BLD AUTO: 4.1 10E3/UL (ref 4–11)

## 2024-01-13 PROCEDURE — 99232 SBSQ HOSP IP/OBS MODERATE 35: CPT | Performed by: STUDENT IN AN ORGANIZED HEALTH CARE EDUCATION/TRAINING PROGRAM

## 2024-01-13 PROCEDURE — 80048 BASIC METABOLIC PNL TOTAL CA: CPT

## 2024-01-13 PROCEDURE — 36415 COLL VENOUS BLD VENIPUNCTURE: CPT

## 2024-01-13 PROCEDURE — 120N000001 HC R&B MED SURG/OB

## 2024-01-13 PROCEDURE — 94640 AIRWAY INHALATION TREATMENT: CPT | Mod: 76

## 2024-01-13 PROCEDURE — G0378 HOSPITAL OBSERVATION PER HR: HCPCS

## 2024-01-13 PROCEDURE — 250N000013 HC RX MED GY IP 250 OP 250 PS 637: Performed by: STUDENT IN AN ORGANIZED HEALTH CARE EDUCATION/TRAINING PROGRAM

## 2024-01-13 PROCEDURE — 250N000009 HC RX 250

## 2024-01-13 PROCEDURE — 85014 HEMATOCRIT: CPT

## 2024-01-13 PROCEDURE — 250N000013 HC RX MED GY IP 250 OP 250 PS 637

## 2024-01-13 RX ORDER — BENZONATATE 100 MG/1
100 CAPSULE ORAL 3 TIMES DAILY PRN
Status: DISCONTINUED | OUTPATIENT
Start: 2024-01-13 | End: 2024-01-17 | Stop reason: HOSPADM

## 2024-01-13 RX ORDER — ENOXAPARIN SODIUM 100 MG/ML
40 INJECTION SUBCUTANEOUS EVERY 24 HOURS
Status: DISCONTINUED | OUTPATIENT
Start: 2024-01-13 | End: 2024-01-16

## 2024-01-13 RX ADMIN — IPRATROPIUM BROMIDE AND ALBUTEROL SULFATE 3 ML: 2.5; .5 SOLUTION RESPIRATORY (INHALATION) at 19:59

## 2024-01-13 RX ADMIN — IPRATROPIUM BROMIDE AND ALBUTEROL SULFATE 3 ML: 2.5; .5 SOLUTION RESPIRATORY (INHALATION) at 08:57

## 2024-01-13 RX ADMIN — ATENOLOL 50 MG: 50 TABLET ORAL at 08:57

## 2024-01-13 RX ADMIN — FUROSEMIDE 30 MG: 20 TABLET ORAL at 09:02

## 2024-01-13 RX ADMIN — AMLODIPINE BESYLATE 10 MG: 10 TABLET ORAL at 08:57

## 2024-01-13 RX ADMIN — IPRATROPIUM BROMIDE AND ALBUTEROL SULFATE 3 ML: 2.5; .5 SOLUTION RESPIRATORY (INHALATION) at 15:02

## 2024-01-13 RX ADMIN — LISINOPRIL 40 MG: 40 TABLET ORAL at 08:57

## 2024-01-13 RX ADMIN — BENZONATATE 100 MG: 100 CAPSULE ORAL at 20:18

## 2024-01-13 RX ADMIN — BENZONATATE 100 MG: 100 CAPSULE ORAL at 11:35

## 2024-01-13 ASSESSMENT — ACTIVITIES OF DAILY LIVING (ADL)
ADLS_ACUITY_SCORE: 22

## 2024-01-13 NOTE — H&P
"[facility]    History and Physical  Hospital Medicine       Date of Admission:  1/12/2024  Date of Service: 1/12/2024     Assessment & Plan   Claudene Y Lundberg is a 83 year old female who presents on 1/12/2024 with shortness of breath and cough x 4 days. She was found to be in acute respiratory failure with hypoxia and hypercapnia suspect secondary to RSV infection.    #Acute respiratory failure with hypoxia and hypercapnia  # Positive RSV infection    First noted to have hypoxia of 82% in urgent care and requiring up to 4 liters O2 via NC. No fever or leukocytosis. Tested positive for RSV. Negative COVID and influenza. VBG without acidosis but notable for hypercapnia (59). CXR no evidence of acute airspace disease. She was given 40 mg prednisone and albuterol nebulizer in ED.  - Supplemental oxygen as needed to keep O2 >90  - Duoneb every 4 hours while awake.    #Hypertension    Chronic. Blood pressures reviewed, and hypertensive at presentation which has since improved. HTN managed PTA with furosemide 30 mg , atenolol 50 mg, amlodipine 10 mg, and lisinopril 40 mg.  - Continue home PAT furosemide, atenolol, amlodipine, and lisinopril.    #Chronic kidney disease, stage 3    Creatine at admission 0.91 with baseline near 0.75 - 0.90. Appears stable at time of admission.  - Monitor with daily BMP    Clinically Significant Risk Factors Present on Admission     # Hypertension: Noted on problem list   # Acute Respiratory Failure: Documented O2 saturation < 91%.  Continue supplemental oxygen as needed     # Overweight: Estimated body mass index is 26.41 kg/m  as calculated from the following:    Height as of this encounter: 1.651 m (5' 5\").    Weight as of this encounter: 72 kg (158 lb 11.7 oz).           Diet: Regular Diet Adult    DVT Prophylaxis: Low Risk/Ambulatory with no VTE prophylaxis indicated  Barron Catheter: Not present  Code Status: No CPR- Do NOT Intubate  Lines: " "PIV    Disposition Plan      Expected Discharge Date: 01/13/2024             Entered: ALYCE JOSHI PA-C 01/12/2024, 9:35 PM    I have discussed patient and formulated plan with attending hospitalist physician, ALYCE Frank PA-C        Primary Care Physician   Karis Colin [phone]    History is obtained from the patient, emergency room physician, and review of old records via the EMR.    History of Present Illness   Claudene Y Lundberg is a 83 year old female with past medical history of hypertension, hyperlipidemia, and chronic kidney disease now presents on 1/12/2024 with shortness of breath and cough x 2 days.      Claudene states that she developed shortness of breath and productive cough x 4 days and wanted to be evaluated out of concern for pneumonia as she has had this earlier this year in April. Inially seen in the urgent care and noted to be hypoxic at 82% and requiring supplemental oxygen up to 5 liters. She states socializing around New Years but does not believe anyone was sick. Denies fevers but states she has been \"cold\" all the time. Her cough is minimally productive and notes various coloration of phlegm. She has also noticed \"rattling\" in her chest. On evaluation she was afebrile without leukocytosis. CXR notable for large hiatal hernia, however no acute airspace disease. VBG showing normal pH with mild hypercapnia (59). BMP and CBC were largely unremarkable. Prior to ED discharge patient desaturated in the 80's with ambulation and warranted her admission for hypoxia.     Review of Systems   Review of Systems   Constitutional:  Negative for chills, fever and malaise/fatigue.   Respiratory:  Positive for cough, sputum production, shortness of breath and wheezing.    Cardiovascular:  Positive for orthopnea. Negative for chest pain, palpitations and leg swelling.   Gastrointestinal:  Negative for abdominal pain, diarrhea, nausea and vomiting.   Genitourinary:  Negative " for dysuria.   Neurological:  Negative for weakness and headaches.     Past Medical History    Patient Active Problem List    Diagnosis Date Noted    Abnormal chest x-ray 01/12/2024     Priority: Medium    Acute respiratory failure with hypoxia (H) 01/12/2024     Priority: Medium    Acute bronchitis due to respiratory syncytial virus (RSV) 01/12/2024     Priority: Medium    Pneumonia of lower lobe due to infectious organism, unspecified laterality 04/11/2023     Priority: Medium    Cough, unspecified type 04/11/2023     Priority: Medium    Chronic kidney disease, stage III (moderate) (H) 11/11/2009     Priority: Medium     Formatting of this note might be different from the original.  11/08 Creatinine 1.3  CREATININE                (MG/DL)   Date Value   11/11/09  1:50 PM 1.5*      Pure hypercholesterolemia 03/10/2008     Priority: Medium     3/08 Cholesterol 225 HDL 57  TG 90  TSH 1.26      Essential hypertension 03/07/2008     Priority: Medium      Past Surgical History   No past surgical history on file.     Prior to Admission Medications   Prior to Admission Medications   Prescriptions Last Dose Informant Patient Reported? Taking?   amLODIPine (NORVASC) 10 MG tablet 1/12/2024 at 1100 Self Yes Yes   Sig: Take 1 tablet by mouth daily   atenolol (TENORMIN) 50 MG tablet 1/12/2024 at 1100 Self Yes Yes   Sig: Take 50 mg by mouth daily   furosemide (LASIX) 20 MG tablet 1/12/2024 at 1100 Self Yes Yes   Sig: Take 30 mg by mouth daily   lisinopril (ZESTRIL) 40 MG tablet 1/12/2024 at 1100 Self Yes Yes   Sig: Take 40 mg by mouth daily      Facility-Administered Medications: None     Allergies   No Known Allergies    Family History    No family history on file.    Social History   Social History     Socioeconomic History    Marital status:      Spouse name: Not on file    Number of children: Not on file    Years of education: Not on file    Highest education level: Not on file   Occupational History    Not on  "file   Tobacco Use    Smoking status: Never    Smokeless tobacco: Never   Substance and Sexual Activity    Alcohol use: Not on file    Drug use: Not on file    Sexual activity: Not on file   Other Topics Concern    Not on file   Social History Narrative    Not on file     Social Determinants of Health     Financial Resource Strain: Not on file   Food Insecurity: Not on file   Transportation Needs: Not on file   Physical Activity: Not on file   Stress: Not on file   Social Connections: Not on file   Interpersonal Safety: Not on file   Housing Stability: Not on file     Physical Exam   /56 (BP Location: Left leg)   Pulse 65   Temp 98.8  F (37.1  C) (Oral)   Resp 18   Ht 1.651 m (5' 5\")   Wt 72 kg (158 lb 11.7 oz)   SpO2 96%   BMI 26.41 kg/m       Weight: 158 lbs 11.7 oz Body mass index is 26.41 kg/m .     Constitutional: Sitting in chair, going through purse, alert, oriented, cooperative, no apparent distress, appears nontoxic.  Eyes: Eyes are clear, pupils are reactive.  HENT: Oropharynx is clear and moist. No evidence of cranial trauma.  Cardiovascular: Regular rate and rhythm, normal S1 and S2. Systolic blowing murmur. JVP is normal. Strong and regular radial pulse. No lower extremity edema bilaterally.   Respiratory: Clear to auscultation bilaterally. No wheezes, rhonchi, or crackles. Normal respiratory on 5 liters O2 via NC with saturations at 93%  GI: Soft, mildly obese, non-tender, normal bowel sounds, no hepatomegaly.  Genitourinary: Deferred  Musculoskeletal: Normal muscle bulk and tone.  Skin: Warm and dry, no rashes.   Neurologic: Neck supple. Cranial nerves are grossly intact.  is symmetric.     Data   Data reviewed today:   Recent Labs   Lab 01/12/24  1507   WBC 5.3   HGB 12.8   MCV 96   *      POTASSIUM 4.7   CHLORIDE 99   CO2 25   BUN 22.1   CR 0.91   ANIONGAP 12   CHASE 9.7   *     Recent Results (from the past 24 hour(s))   Chest XR,  PA & LAT    Narrative    CHEST " TWO VIEWS  1/12/2024 1:01 PM     HISTORY: Shortness of breath and cough.    COMPARISON: 4/11/2023.      Impression    IMPRESSION: Large hiatal hernia appears increased in distention. No  acute airspace disease. Normal cardiac silhouette.    JESSICA ARRINGTON MD         SYSTEM ID:  [id number]

## 2024-01-13 NOTE — PLAN OF CARE
Problem: Adult Inpatient Plan of Care  Goal: Plan of Care Review  Description: The Plan of Care Review/Shift note should be completed every shift.  The Outcome Evaluation is a brief statement about your assessment that the patient is improving, declining, or no change.  This information will be displayed automatically on your shift  note.  Flowsheets (Taken 1/13/2024 0643)  Outcome Evaluation: Patient alert and oriented, denies pain overnight and slept well.  Vitals stable, maintaining 4L O2 to stay at 90%, coarse lungs sounds  Plan of Care Reviewed With: patient  Overall Patient Progress: improving     Problem: Adult Inpatient Plan of Care  Goal: Absence of Hospital-Acquired Illness or Injury  Intervention: Identify and Manage Fall Risk  Recent Flowsheet Documentation  Taken 1/13/2024 0240 by Marjan Lr RN  Safety Promotion/Fall Prevention:   assistive device/personal items within reach   lighting adjusted   mobility aid in reach   nonskid shoes/slippers when out of bed   safety round/check completed   Goal Outcome Evaluation:      Plan of Care Reviewed With: patient    Overall Patient Progress: improvingOverall Patient Progress: improving    Outcome Evaluation: Patient alert and oriented, denies pain overnight and slept well.  Vitals stable, maintaining 4L O2 to stay at 90%, coarse lungs sounds

## 2024-01-13 NOTE — PLAN OF CARE
Problem: Gas Exchange Impaired  Goal: Optimal Gas Exchange  Intervention: Optimize Oxygenation and Ventilation  Recent Flowsheet Documentation  Taken 1/13/2024 1505 by Nayeli Suárez RN  Head of Bed (HOB) Positioning: (patient is able to self adjust) HOB at 60 degrees  Taken 1/13/2024 0800 by Nayeli Suárez, RN  Head of Bed (HOB) Positioning: (patient is able to self adjust HOB) HOB at 45 degrees   Patient 02 sat maintained greater than 90% on 5 lpm via NC. Continues with intermittent congested cough.   Problem: Adult Inpatient Plan of Care  Goal: Absence of Hospital-Acquired Illness or Injury  Intervention: Prevent and Manage VTE (Venous Thromboembolism) Risk  Recent Flowsheet Documentation  Taken 1/13/2024 1505 by Nayeli Suárez RN  VTE Prevention/Management: SCDs (sequential compression devices) off   MD ordered enoxaprin.  1804 Patient refused enoxaprin despite education.

## 2024-01-13 NOTE — PROGRESS NOTES
"[facility]    Medicine Progress Note - Hospitalist Service    Date of Admission:  1/12/2024    Assessment & Plan   Claudene Y Lundberg is a 83 year old female with past medical history of HTN, HLD, and CKD who presented to the hospital with SOB and was admitted with hypoxia suspect secondary to RSV infection.    # Acute respiratory failure with hypoxia and hypercapnia  # RSV infection    First noted to have hypoxia of 82% in urgent care and requiring up to 4 liters O2 via NC. No fever or leukocytosis. Tested positive for RSV. Negative COVID and influenza. VBG without acidosis but notable for hypercapnia (59). CXR no evidence of acute airspace disease. She was given 40 mg prednisone and albuterol nebulizer in ED.  - Supplemental oxygen as needed to keep O2 >90  - Duoneb every 4 hours while awake.    #Hypertension    Chronic. Blood pressures reviewed, and hypertensive at presentation which has since improved. HTN managed PTA with furosemide 30 mg , atenolol 50 mg, amlodipine 10 mg, and lisinopril 40 mg.  - Continue home PTA furosemide, atenolol, amlodipine, and lisinopril.    #Chronic kidney disease, stage 3    Creatine at admission 0.91 with baseline near 0.75 - 0.90. Appears stable at time of admission.  - Monitor with daily BMP        Diet: Regular Diet Adult    DVT Prophylaxis: Enoxaparin (Lovenox) SQ  Barron Catheter: Not present  Lines: None     Cardiac Monitoring: None  Code Status: No CPR- Do NOT Intubate      Clinically Significant Risk Factors Present on Admission                  # Hypertension: Noted on problem list      # Overweight: Estimated body mass index is 26.41 kg/m  as calculated from the following:    Height as of this encounter: 1.651 m (5' 5\").    Weight as of this encounter: 72 kg (158 lb 11.7 oz).              Disposition Plan      Expected Discharge Date: 01/15/2024                    Pito Garza MD  Hospitalist Service  [facility]  Securely message with Ryan (more info)  Text page via [facility] Paging/Directory   ______________________________________________________________________    Interval History   Admitted yesterday with no acute events since admission. Continues to require supplemental oxygen especially with activities. This morning she states that she feel ok but that she had a difficult time resetting overnight with the alarms from the O2 prob. There has been a difficult time getting a good pleth with her nail polish and it has been challenging to remove. She denies feeling short of breath but continues to have a cough that has been bothersome to her.     Physical Exam   Vital Signs: Temp: 97.5  F (36.4  C) Temp src: Oral BP: 110/53 Pulse: 61   Resp: 18 SpO2: 95 % O2 Device: Nasal cannula Oxygen Delivery: 5 LPM  Weight: 158 lbs 11.7 oz    General Appearance: Awake and alert sitting up in a chair in no acute distress   Respiratory: Breathing easily on nasal canula, intermittent nonproductive cough   Cardiovascular: Regular rate and rhythm, extremities warm and well perfused   GI: Abdomen soft non-tender and non-distended   Skin: No rashes or lesions on exposed skin   Other: Appropriate mood and affect, no focal deficits appreciated      Medical Decision Making       45 MINUTES SPENT BY ME on the date of service doing chart review, history, exam, documentation & further activities per the note.      Data     I have personally reviewed the following data over the past 24 hrs:    4.1  \   11.8   / 140 (L)     139 103 29.4 (H) /  120 (H)   5.1 25 0.90 \       Imaging results reviewed over the past 24 hrs:   No results found for this or any previous visit (from the past 24 hour(s)).

## 2024-01-13 NOTE — PLAN OF CARE
"[facility] ADMISSION NOTE    Patient admitted to room 2403 at approximately 1756 via cart from emergency room. Patient was accompanied by transport tech.     Verbal SBAR report received from Keya LOZANO prior to patient arrival.     Patient ambulated to bed with stand-by assist. Patient alert and oriented X 3. The patient is not having any pain.  . Admission vital signs: Blood pressure 102/56, pulse 65, temperature 98.8  F (37.1  C), temperature source Oral, resp. rate 18, height 1.651 m (5' 5\"), weight 72 kg (158 lb 11.7 oz), SpO2 93%. Patient was oriented to plan of care, call light, bed controls, tv, telephone, bathroom, and visiting hours.     Risk Assessment    The following safety risks were identified during admission: isolation. Yellow risk band applied: NO.     Skin Initial Assessment    Skin assessment not completed on admission; patient refused full skin assessment.         Education    Patient has a [place] to Observation order: Yes  Observation education completed and documented: Yes      Ofe Rosales RN    Goal Outcome Evaluation:                        "

## 2024-01-13 NOTE — PROGRESS NOTES
Betty 02 sats flucutate with speaking and activity. At rest 02 sats on 4 lpm via NC 94-96%, 02 sats dip to low 80's with activity. Dr Garza updated on variable 02 saturation readings and 02 sats at rest should be greater than 90% prior to adjustment ( per continous 02 order). Humidity added to 02 for comfort. Tessalon perle given for cough with relief. Education provided to patient on RSV. Up independently in room.

## 2024-01-13 NOTE — PLAN OF CARE
Goal Outcome Evaluation:  Patient is alert and oriented up in room independently with steady gait. Oxygen at 4 LPM NC to keep above 90% Occasional non-productive cough. Denies pain.

## 2024-01-14 LAB
ANION GAP SERPL CALCULATED.3IONS-SCNC: 9 MMOL/L (ref 7–15)
BUN SERPL-MCNC: 28.6 MG/DL (ref 8–23)
CALCIUM SERPL-MCNC: 9.6 MG/DL (ref 8.8–10.2)
CHLORIDE SERPL-SCNC: 102 MMOL/L (ref 98–107)
CREAT SERPL-MCNC: 0.84 MG/DL (ref 0.51–0.95)
DEPRECATED HCO3 PLAS-SCNC: 26 MMOL/L (ref 22–29)
EGFRCR SERPLBLD CKD-EPI 2021: 69 ML/MIN/1.73M2
ERYTHROCYTE [DISTWIDTH] IN BLOOD BY AUTOMATED COUNT: 14 % (ref 10–15)
GLUCOSE SERPL-MCNC: 105 MG/DL (ref 70–99)
HCT VFR BLD AUTO: 38 % (ref 35–47)
HGB BLD-MCNC: 12 G/DL (ref 11.7–15.7)
MCH RBC QN AUTO: 30.6 PG (ref 26.5–33)
MCHC RBC AUTO-ENTMCNC: 31.6 G/DL (ref 31.5–36.5)
MCV RBC AUTO: 97 FL (ref 78–100)
PLATELET # BLD AUTO: 141 10E3/UL (ref 150–450)
POTASSIUM SERPL-SCNC: 5 MMOL/L (ref 3.4–5.3)
RBC # BLD AUTO: 3.92 10E6/UL (ref 3.8–5.2)
SODIUM SERPL-SCNC: 137 MMOL/L (ref 135–145)
WBC # BLD AUTO: 9.8 10E3/UL (ref 4–11)

## 2024-01-14 PROCEDURE — 80048 BASIC METABOLIC PNL TOTAL CA: CPT | Performed by: STUDENT IN AN ORGANIZED HEALTH CARE EDUCATION/TRAINING PROGRAM

## 2024-01-14 PROCEDURE — 99232 SBSQ HOSP IP/OBS MODERATE 35: CPT | Performed by: STUDENT IN AN ORGANIZED HEALTH CARE EDUCATION/TRAINING PROGRAM

## 2024-01-14 PROCEDURE — 250N000013 HC RX MED GY IP 250 OP 250 PS 637: Performed by: STUDENT IN AN ORGANIZED HEALTH CARE EDUCATION/TRAINING PROGRAM

## 2024-01-14 PROCEDURE — 85027 COMPLETE CBC AUTOMATED: CPT | Performed by: STUDENT IN AN ORGANIZED HEALTH CARE EDUCATION/TRAINING PROGRAM

## 2024-01-14 PROCEDURE — 36415 COLL VENOUS BLD VENIPUNCTURE: CPT | Performed by: STUDENT IN AN ORGANIZED HEALTH CARE EDUCATION/TRAINING PROGRAM

## 2024-01-14 PROCEDURE — 250N000013 HC RX MED GY IP 250 OP 250 PS 637

## 2024-01-14 PROCEDURE — 94640 AIRWAY INHALATION TREATMENT: CPT | Mod: 76

## 2024-01-14 PROCEDURE — 999N000157 HC STATISTIC RCP TIME EA 10 MIN

## 2024-01-14 PROCEDURE — 250N000009 HC RX 250

## 2024-01-14 PROCEDURE — 120N000001 HC R&B MED SURG/OB

## 2024-01-14 RX ORDER — GUAIFENESIN/DEXTROMETHORPHAN 100-10MG/5
10 SYRUP ORAL EVERY 4 HOURS PRN
Status: DISCONTINUED | OUTPATIENT
Start: 2024-01-14 | End: 2024-01-17 | Stop reason: HOSPADM

## 2024-01-14 RX ORDER — IPRATROPIUM BROMIDE AND ALBUTEROL SULFATE 2.5; .5 MG/3ML; MG/3ML
3 SOLUTION RESPIRATORY (INHALATION) EVERY 4 HOURS PRN
Status: DISCONTINUED | OUTPATIENT
Start: 2024-01-14 | End: 2024-01-17 | Stop reason: HOSPADM

## 2024-01-14 RX ADMIN — ATENOLOL 50 MG: 50 TABLET ORAL at 08:41

## 2024-01-14 RX ADMIN — IPRATROPIUM BROMIDE AND ALBUTEROL SULFATE 3 ML: 2.5; .5 SOLUTION RESPIRATORY (INHALATION) at 09:36

## 2024-01-14 RX ADMIN — AMLODIPINE BESYLATE 10 MG: 10 TABLET ORAL at 08:42

## 2024-01-14 RX ADMIN — GUAIFENESIN SYRUP AND DEXTROMETHORPHAN 10 ML: 100; 10 SYRUP ORAL at 09:36

## 2024-01-14 RX ADMIN — FUROSEMIDE 30 MG: 20 TABLET ORAL at 08:42

## 2024-01-14 RX ADMIN — GUAIFENESIN SYRUP AND DEXTROMETHORPHAN 10 ML: 100; 10 SYRUP ORAL at 20:43

## 2024-01-14 RX ADMIN — IPRATROPIUM BROMIDE AND ALBUTEROL SULFATE 3 ML: 2.5; .5 SOLUTION RESPIRATORY (INHALATION) at 11:20

## 2024-01-14 RX ADMIN — LISINOPRIL 40 MG: 40 TABLET ORAL at 08:42

## 2024-01-14 ASSESSMENT — ACTIVITIES OF DAILY LIVING (ADL)
ADLS_ACUITY_SCORE: 22

## 2024-01-14 NOTE — PLAN OF CARE
"Goal Outcome Evaluation:      Plan of Care Reviewed With: patient    Problem: Adult Inpatient Plan of Care  Goal: Plan of Care Review  Description: The Plan of Care Review/Shift note should be completed every shift.  The Outcome Evaluation is a brief statement about your assessment that the patient is improving, declining, or no change.  This information will be displayed automatically on your shift  note.  Outcome: Not Progressing  Flowsheets (Taken 1/14/2024 1036)  Plan of Care Reviewed With: patient  Goal: Patient-Specific Goal (Individualized)  Description: You can add care plan individualizations to a care plan. Examples of Individualization might be:  \"Parent requests to be called daily at 9am for status\", \"I have a hard time hearing out of my right ear\", or \"Do not touch me to wake me up as it startles  me\".  Outcome: Not Progressing  Goal: Absence of Hospital-Acquired Illness or Injury  Outcome: Not Progressing  Intervention: Identify and Manage Fall Risk  Recent Flowsheet Documentation  Taken 1/14/2024 0839 by Pamela Mcarthur RN  Safety Promotion/Fall Prevention:   increased rounding and observation   room near nurse's station   nonskid shoes/slippers when out of bed   patient and family education  Intervention: Prevent Skin Injury  Recent Flowsheet Documentation  Taken 1/14/2024 0941 by Pamela Mcarthur RN  Body Position:   position changed independently   weight shifting  Taken 1/14/2024 0839 by Pamela Mcarthur RN  Body Position: position changed independently  Intervention: Prevent and Manage VTE (Venous Thromboembolism) Risk  Recent Flowsheet Documentation  Taken 1/14/2024 0839 by Pamela Mcarthur RN  VTE Prevention/Management: (ambulation in the room) SCDs (sequential compression devices) off  Goal: Optimal Comfort and Wellbeing  Outcome: Not Progressing  Goal: Readiness for Transition of Care  Outcome: Not Progressing     Problem: Gas Exchange Impaired  Goal: Optimal Gas " Exchange  Outcome: Not Progressing     Problem: Risk for Delirium  Goal: Improved Behavioral Control  Outcome: Not Progressing  Intervention: Minimize Safety Risk  Recent Flowsheet Documentation  Taken 1/14/2024 9626 by Pamela Mcarthur RN  Enhanced Safety Measures: room near unit station  Goal: Improved Attention and Thought Clarity  Outcome: Not Progressing  Goal: Improved Sleep  Outcome: Not Progressing     Patient on 5L SpO2, 90-92% on toe. Independent it the room, patient makes needs known. Patient stated the Tessalon is not working, robitussin order and given.

## 2024-01-14 NOTE — PLAN OF CARE
Goal Outcome Evaluation:      Plan of Care Reviewed With: patient    Overall Patient Progress: improving      Problem: Adult Inpatient Plan of Care  Goal: Plan of Care Review  Description: The Plan of Care Review/Shift note should be completed every shift.  The Outcome Evaluation is a brief statement about your assessment that the patient is improving, declining, or no change.  This information will be displayed automatically on your shift  note.  Outcome: Progressing  Flowsheets (Taken 1/14/2024 0410)  Plan of Care Reviewed With: patient  Overall Patient Progress: improving     Problem: Gas Exchange Impaired  Goal: Optimal Gas Exchange  Outcome: Progressing     Patient resting comfortably during night. Maintaining adequate SpO2 on 5 LPM. Tessalon given for cough which was effective.

## 2024-01-14 NOTE — PROGRESS NOTES
"[facility]    Medicine Progress Note - Hospitalist Service    Date of Admission:  1/12/2024    Assessment & Plan   Claudene Y Lundberg is a 83 year old female with past medical history of HTN, HLD, and CKD who presented to the hospital with SOB and was admitted with hypoxia suspect secondary to RSV infection. Continued hypoxia and need for 5L NC prolonging hospitalization.     # Acute respiratory failure with hypoxia and hypercapnia  # RSV infection  First noted to have hypoxia of 82% in urgent care and requiring up to 4 liters O2 via NC. No fever or leukocytosis. Tested positive for RSV. Negative COVID and influenza. VBG without acidosis but notable for hypercapnia (59). CXR no evidence of acute airspace disease. Initially started on prednisone but not continued as no apparent COPD or asthma.   - Supplemental oxygen as needed to keep O2 >90  - Duoneb available PRN but has not been wheezy   - Benzonatate and guaifenesin-dextromethorphan available for cough     #Hypertension    Chronic. Blood pressures reviewed, and hypertensive at presentation which has since improved. HTN managed PTA with furosemide 30 mg , atenolol 50 mg, amlodipine 10 mg, and lisinopril 40 mg.  - Holding PTA  furosemide, amlodipine, and lisinopril with some lower blood pressures   - Continued PTA atenolol    #Chronic kidney disease, stage 3    Creatine at admission 0.91 with baseline near 0.75 - 0.90. Appears stable at time of admission.  - Monitor with daily BMP        Diet: Regular Diet Adult    DVT Prophylaxis: Enoxaparin (Lovenox) SQ  Barron Catheter: Not present  Lines: None     Cardiac Monitoring: None  Code Status: No CPR- Do NOT Intubate      Clinically Significant Risk Factors Present on Admission                  # Hypertension: Noted on problem list      # Overweight: Estimated body mass index is 26.41 kg/m  as calculated from the following:    Height as of this encounter: 1.651 m (5' 5\").    Weight as of " this encounter: 72 kg (158 lb 11.7 oz).              Disposition Plan     Expected Discharge Date: 01/15/2024                  Pito Garza MD  Hospitalist Service  [facility]  Securely message with [employer] (more info)  Text page via [employer] Paging/Directory   ______________________________________________________________________    Interval History   No acute events overnight. Patient continues to struggle with cough but has not had any fevers or chills. States that RSV has been miserable and that she would have rather had another child than be here with RSV. Hopeful that she will be able to leave the hospital soon.     Physical Exam   Vital Signs: Temp: 97.8  F (36.6  C) Temp src: Oral BP: 109/51 Pulse: 62   Resp: 17 SpO2: 90 % O2 Device: Nasal cannula with humidification Oxygen Delivery: 5 LPM  Weight: 158 lbs 11.7 oz    General Appearance: Awake and alert sitting up in a chair in no acute distress   Respiratory: Breathing easily on nasal canula, intermittent nonproductive cough, lungs clear to ausculation    Cardiovascular: Regular rate and rhythm, extremities warm and well perfused   GI: Abdomen soft non-tender and non-distended   Skin: No rashes or lesions on exposed skin   Other: Appropriate mood and affect, no focal deficits appreciated      Medical Decision Making       45 MINUTES SPENT BY ME on the date of service doing chart review, history, exam, documentation & further activities per the note.      Data     I have personally reviewed the following data over the past 24 hrs:    9.8  \   12.0   / 141 (L)     137 102 28.6 (H) /  105 (H)   5.0 26 0.84 \       Imaging results reviewed over the past 24 hrs:   No results found for this or any previous visit (from the past 24 hour(s)).

## 2024-01-15 LAB
ANION GAP SERPL CALCULATED.3IONS-SCNC: 8 MMOL/L (ref 7–15)
BUN SERPL-MCNC: 31.9 MG/DL (ref 8–23)
CALCIUM SERPL-MCNC: 9.7 MG/DL (ref 8.8–10.2)
CHLORIDE SERPL-SCNC: 103 MMOL/L (ref 98–107)
CREAT SERPL-MCNC: 0.99 MG/DL (ref 0.51–0.95)
DEPRECATED HCO3 PLAS-SCNC: 29 MMOL/L (ref 22–29)
EGFRCR SERPLBLD CKD-EPI 2021: 57 ML/MIN/1.73M2
ERYTHROCYTE [DISTWIDTH] IN BLOOD BY AUTOMATED COUNT: 13.9 % (ref 10–15)
GLUCOSE SERPL-MCNC: 90 MG/DL (ref 70–99)
HCT VFR BLD AUTO: 38.4 % (ref 35–47)
HGB BLD-MCNC: 11.9 G/DL (ref 11.7–15.7)
MCH RBC QN AUTO: 30.1 PG (ref 26.5–33)
MCHC RBC AUTO-ENTMCNC: 31 G/DL (ref 31.5–36.5)
MCV RBC AUTO: 97 FL (ref 78–100)
PLATELET # BLD AUTO: 149 10E3/UL (ref 150–450)
POTASSIUM SERPL-SCNC: 5.3 MMOL/L (ref 3.4–5.3)
RBC # BLD AUTO: 3.96 10E6/UL (ref 3.8–5.2)
SODIUM SERPL-SCNC: 140 MMOL/L (ref 135–145)
WBC # BLD AUTO: 6 10E3/UL (ref 4–11)

## 2024-01-15 PROCEDURE — 250N000013 HC RX MED GY IP 250 OP 250 PS 637

## 2024-01-15 PROCEDURE — 85027 COMPLETE CBC AUTOMATED: CPT | Performed by: STUDENT IN AN ORGANIZED HEALTH CARE EDUCATION/TRAINING PROGRAM

## 2024-01-15 PROCEDURE — 250N000013 HC RX MED GY IP 250 OP 250 PS 637: Performed by: STUDENT IN AN ORGANIZED HEALTH CARE EDUCATION/TRAINING PROGRAM

## 2024-01-15 PROCEDURE — 99232 SBSQ HOSP IP/OBS MODERATE 35: CPT | Performed by: STUDENT IN AN ORGANIZED HEALTH CARE EDUCATION/TRAINING PROGRAM

## 2024-01-15 PROCEDURE — 80048 BASIC METABOLIC PNL TOTAL CA: CPT | Performed by: STUDENT IN AN ORGANIZED HEALTH CARE EDUCATION/TRAINING PROGRAM

## 2024-01-15 PROCEDURE — 120N000001 HC R&B MED SURG/OB

## 2024-01-15 PROCEDURE — 36415 COLL VENOUS BLD VENIPUNCTURE: CPT | Performed by: STUDENT IN AN ORGANIZED HEALTH CARE EDUCATION/TRAINING PROGRAM

## 2024-01-15 RX ORDER — ALBUTEROL SULFATE 90 UG/1
2 AEROSOL, METERED RESPIRATORY (INHALATION)
Status: DISCONTINUED | OUTPATIENT
Start: 2024-01-15 | End: 2024-01-16

## 2024-01-15 RX ORDER — AMLODIPINE BESYLATE 10 MG/1
10 TABLET ORAL DAILY
Status: DISCONTINUED | OUTPATIENT
Start: 2024-01-16 | End: 2024-01-17 | Stop reason: HOSPADM

## 2024-01-15 RX ORDER — LISINOPRIL 40 MG/1
40 TABLET ORAL DAILY
Status: DISCONTINUED | OUTPATIENT
Start: 2024-01-15 | End: 2024-01-17 | Stop reason: HOSPADM

## 2024-01-15 RX ADMIN — ALBUTEROL SULFATE 2 PUFF: 90 AEROSOL, METERED RESPIRATORY (INHALATION) at 17:59

## 2024-01-15 RX ADMIN — FUROSEMIDE 30 MG: 20 TABLET ORAL at 13:57

## 2024-01-15 RX ADMIN — ALBUTEROL SULFATE 2 PUFF: 90 AEROSOL, METERED RESPIRATORY (INHALATION) at 13:57

## 2024-01-15 RX ADMIN — GUAIFENESIN SYRUP AND DEXTROMETHORPHAN 10 ML: 100; 10 SYRUP ORAL at 08:22

## 2024-01-15 RX ADMIN — GUAIFENESIN SYRUP AND DEXTROMETHORPHAN 10 ML: 100; 10 SYRUP ORAL at 21:50

## 2024-01-15 RX ADMIN — LISINOPRIL 40 MG: 40 TABLET ORAL at 13:57

## 2024-01-15 RX ADMIN — ATENOLOL 50 MG: 50 TABLET ORAL at 08:22

## 2024-01-15 RX ADMIN — ALBUTEROL SULFATE 2 PUFF: 90 AEROSOL, METERED RESPIRATORY (INHALATION) at 21:45

## 2024-01-15 ASSESSMENT — ACTIVITIES OF DAILY LIVING (ADL)
ADLS_ACUITY_SCORE: 22

## 2024-01-15 NOTE — PLAN OF CARE
Time: Assumed care of patient at 0700 on 01/15/24.     Reason, date of admission: Admitted on 1.12.24 for RSV  Inpatient status    Admitted from:  ED via car from home     Code: DNR/DNI    Isolation? Yes, contact, droplet    History: HTN, CKD III, HLD    Activity: IND    Neuro: Alert and oriented    O2: Yes: 4-5 LPM    GI/:  WNL       Last BM: Today    Lines/Drains: PIV right AC      Fluids: SL     Pain: Denied    Plan: Possible discharge tomorrow     Goal Outcome Evaluation:      Plan of Care Reviewed With: patient    Overall Patient Progress: no changeOverall Patient Progress: no change    Outcome Evaluation: Patient continues to require 4-5 LPM supplemental oxygen to maintain adequate SpO2

## 2024-01-15 NOTE — PLAN OF CARE
Goal Outcome Evaluation:      Plan of Care Reviewed With: patient    Overall Patient Progress: no change    Outcome Evaluation: Patient continues to require 4-5 LPM supplemental oxygen to maintain adequate SpO2.      Problem: Gas Exchange Impaired  Goal: Optimal Gas Exchange  Outcome: Progressing

## 2024-01-15 NOTE — PLAN OF CARE
Problem: Adult Inpatient Plan of Care  Goal: Optimal Comfort and Wellbeing  Outcome: Progressing     Problem: Gas Exchange Impaired  Goal: Optimal Gas Exchange  Outcome: Progressing   Goal Outcome Evaluation:

## 2024-01-15 NOTE — PROGRESS NOTES
"[facility]    Medicine Progress Note - Hospitalist Service    Date of Admission:  1/12/2024    Assessment & Plan   Claudene Y Lundberg is a 83 year old female with past medical history of HTN, HLD, and CKD who presented to the hospital with SOB and was admitted with hypoxia suspect secondary to RSV infection. Continued hypoxia and need for 5L NC prolonging hospitalization.     Acute respiratory failure with hypoxia and hypercapnia  RSV infection  First noted to have hypoxia of 82% in urgent care and requiring up to 4 liters O2 via NC. No fever or leukocytosis. Tested positive for RSV. Negative COVID and influenza. VBG without acidosis but notable for hypercapnia (59). CXR no evidence of acute airspace disease.   Initially started on prednisone but not continued as no apparent COPD or asthma.     - added IS  - Supplemental oxygen as needed to keep SpO2 >90  - Duoneb available PRN but has not been wheezy   - Benzonatate and guaifenesin-dextromethorphan available for cough     Hypertension    Chronic. Blood pressures reviewed, and hypertensive at presentation which has since improved. HTN managed PTA with furosemide 30 mg , atenolol 50 mg, amlodipine 10 mg, and lisinopril 40 mg.    - Restarted PTA  furosemide, amlodipine, and lisinopril with some hold parameters  - Continued PTA atenolol    Chronic kidney disease, stage 3    Creatine at admission 0.91 with baseline near 0.75 - 0.90. Appears stable at time of admission.  - Monitor with daily BMP          Diet: Regular Diet Adult    DVT Prophylaxis: Enoxaparin (Lovenox) SQ  Barron Catheter: Not present  Lines: None     Cardiac Monitoring: None  Code Status: No CPR- Do NOT Intubate      Clinically Significant Risk Factors                  # Hypertension: Noted on problem list        # Overweight: Estimated body mass index is 26.41 kg/m  as calculated from the following:    Height as of this encounter: 1.651 m (5' 5\").    Weight as of this " encounter: 72 kg (158 lb 11.7 oz)., PRESENT ON ADMISSION            Disposition Plan     Expected Discharge Date: 01/16/2024                    Armando Gonzalez DO  Hospitalist Service  [facility]  Securely message with [employer] (more info)  Text page via [employer] Paging/Directory   ______________________________________________________________________    Interval History   NAEO. Still requiring 4-5L O2 NC.    Physical Exam   Vital Signs: Temp: 98.3  F (36.8  C) Temp src: Oral BP: 105/65 (Denies dizziness and light-headedness) Pulse: 59   Resp: 16 SpO2: 94 % O2 Device: Nasal cannula with humidification Oxygen Delivery: 5 LPM  Weight: 158 lbs 11.7 oz    Physical Exam  Constitutional:       Appearance: She is not toxic-appearing.   HENT:      Head: Normocephalic and atraumatic.      Nose: Nose normal.   Eyes:      General: No scleral icterus.     Conjunctiva/sclera: Conjunctivae normal.   Pulmonary:      Effort: Pulmonary effort is normal.      Breath sounds: Rhonchi (bibasilar) present. No wheezing.   Abdominal:      General: Abdomen is flat.   Skin:     General: Skin is dry.      Findings: No rash.   Neurological:      General: No focal deficit present.      Mental Status: She is alert.   Psychiatric:         Mood and Affect: Mood normal.         Behavior: Behavior normal.           Medical Decision Making       36 MINUTES SPENT BY ME on the date of service doing chart review, history, exam, documentation & further activities per the note.      Data     I have personally reviewed the following data over the past 24 hrs:    6.0  \   11.9   / 149 (L)     140 103 31.9 (H) /  90   5.3 29 0.99 (H) \       Imaging results reviewed over the past 24 hrs:   No results found for this or any previous visit (from the past 24 hour(s)).

## 2024-01-16 LAB
CREAT SERPL-MCNC: 0.91 MG/DL (ref 0.51–0.95)
EGFRCR SERPLBLD CKD-EPI 2021: 63 ML/MIN/1.73M2
PLATELET # BLD AUTO: 183 10E3/UL (ref 150–450)

## 2024-01-16 PROCEDURE — 250N000013 HC RX MED GY IP 250 OP 250 PS 637

## 2024-01-16 PROCEDURE — 250N000009 HC RX 250: Performed by: STUDENT IN AN ORGANIZED HEALTH CARE EDUCATION/TRAINING PROGRAM

## 2024-01-16 PROCEDURE — 250N000013 HC RX MED GY IP 250 OP 250 PS 637: Performed by: STUDENT IN AN ORGANIZED HEALTH CARE EDUCATION/TRAINING PROGRAM

## 2024-01-16 PROCEDURE — 120N000001 HC R&B MED SURG/OB

## 2024-01-16 PROCEDURE — 94640 AIRWAY INHALATION TREATMENT: CPT | Mod: 76

## 2024-01-16 PROCEDURE — 999N000157 HC STATISTIC RCP TIME EA 10 MIN

## 2024-01-16 PROCEDURE — 82565 ASSAY OF CREATININE: CPT | Performed by: STUDENT IN AN ORGANIZED HEALTH CARE EDUCATION/TRAINING PROGRAM

## 2024-01-16 PROCEDURE — 99232 SBSQ HOSP IP/OBS MODERATE 35: CPT | Performed by: STUDENT IN AN ORGANIZED HEALTH CARE EDUCATION/TRAINING PROGRAM

## 2024-01-16 PROCEDURE — 85049 AUTOMATED PLATELET COUNT: CPT | Performed by: STUDENT IN AN ORGANIZED HEALTH CARE EDUCATION/TRAINING PROGRAM

## 2024-01-16 PROCEDURE — 94640 AIRWAY INHALATION TREATMENT: CPT

## 2024-01-16 PROCEDURE — 36415 COLL VENOUS BLD VENIPUNCTURE: CPT | Performed by: STUDENT IN AN ORGANIZED HEALTH CARE EDUCATION/TRAINING PROGRAM

## 2024-01-16 RX ORDER — ALBUTEROL SULFATE 90 UG/1
2 AEROSOL, METERED RESPIRATORY (INHALATION) EVERY 4 HOURS PRN
Status: DISCONTINUED | OUTPATIENT
Start: 2024-01-16 | End: 2024-01-17 | Stop reason: HOSPADM

## 2024-01-16 RX ORDER — IPRATROPIUM BROMIDE AND ALBUTEROL SULFATE 2.5; .5 MG/3ML; MG/3ML
3 SOLUTION RESPIRATORY (INHALATION)
Status: COMPLETED | OUTPATIENT
Start: 2024-01-16 | End: 2024-01-17

## 2024-01-16 RX ADMIN — LISINOPRIL 40 MG: 40 TABLET ORAL at 07:47

## 2024-01-16 RX ADMIN — GUAIFENESIN SYRUP AND DEXTROMETHORPHAN 10 ML: 100; 10 SYRUP ORAL at 09:53

## 2024-01-16 RX ADMIN — ALBUTEROL SULFATE 2 PUFF: 90 AEROSOL, METERED RESPIRATORY (INHALATION) at 14:21

## 2024-01-16 RX ADMIN — AMLODIPINE BESYLATE 10 MG: 10 TABLET ORAL at 07:47

## 2024-01-16 RX ADMIN — ALBUTEROL SULFATE 2 PUFF: 90 AEROSOL, METERED RESPIRATORY (INHALATION) at 05:42

## 2024-01-16 RX ADMIN — ALBUTEROL SULFATE 2 PUFF: 90 AEROSOL, METERED RESPIRATORY (INHALATION) at 09:53

## 2024-01-16 RX ADMIN — ATENOLOL 50 MG: 50 TABLET ORAL at 07:47

## 2024-01-16 RX ADMIN — ALBUTEROL SULFATE 2 PUFF: 90 INHALANT RESPIRATORY (INHALATION) at 21:13

## 2024-01-16 RX ADMIN — IPRATROPIUM BROMIDE AND ALBUTEROL SULFATE 3 ML: 2.5; .5 SOLUTION RESPIRATORY (INHALATION) at 16:32

## 2024-01-16 RX ADMIN — BENZOCAINE 6 MG-MENTHOL 10 MG LOZENGES 1 LOZENGE: at 21:13

## 2024-01-16 RX ADMIN — IPRATROPIUM BROMIDE AND ALBUTEROL SULFATE 3 ML: 2.5; .5 SOLUTION RESPIRATORY (INHALATION) at 20:38

## 2024-01-16 RX ADMIN — FUROSEMIDE 30 MG: 20 TABLET ORAL at 07:46

## 2024-01-16 ASSESSMENT — ACTIVITIES OF DAILY LIVING (ADL)
ADLS_ACUITY_SCORE: 22
ADLS_ACUITY_SCORE: 24
ADLS_ACUITY_SCORE: 22

## 2024-01-16 NOTE — PROGRESS NOTES
[facility]    Medicine Progress Note - Hospitalist Service    Date of Admission:  1/12/2024    Assessment & Plan   Claudene Y Lundberg is a 83 year old female with past medical history of HTN, HLD, and CKD who presented to the hospital with SOB and was admitted with hypoxia suspect secondary to RSV infection. Continued hypoxia and need for 5L NC prolonging hospitalization.     Acute respiratory failure with hypoxia and hypercapnia  RSV infection  First noted to have hypoxia of 82% in urgent care and requiring up to 4 liters O2 via NC. No fever or leukocytosis. Tested positive for RSV. Negative COVID and influenza. VBG without acidosis but notable for hypercapnia (59). CXR no evidence of acute airspace disease.   Initially started on prednisone but not continued as no apparent COPD or asthma. Is wheezy 01/16 but will start nebs scheduled to help. Encouraged use of IS.    - added IS and SCHEDULED nebs  - Supplemental oxygen as needed to keep SpO2 >90  - Duoneb available PRN after scheduled stops  - Benzonatate and guaifenesin-dextromethorphan available for cough     Hypertension    Chronic. Blood pressures reviewed, and hypertensive at presentation which has since improved. HTN managed PTA with furosemide 30 mg , atenolol 50 mg, amlodipine 10 mg, and lisinopril 40 mg.    - Restarted PTA  furosemide, amlodipine, and lisinopril with some hold parameters  - Continued PTA atenolol    Chronic kidney disease, stage 3    Creatine at admission 0.91 with baseline near 0.75 - 0.90. Appears stable at time of admission.  - Monitor with daily BMP          Diet: Regular Diet Adult    DVT Prophylaxis: SCDs  Barron Catheter: Not present  Lines: None     Cardiac Monitoring: None  Code Status: No CPR- Do NOT Intubate      Clinically Significant Risk Factors                  # Hypertension: Noted on problem list        # Overweight: Estimated body mass index is 26.41 kg/m  as calculated from the following:     "Height as of this encounter: 1.651 m (5' 5\").    Weight as of this encounter: 72 kg (158 lb 11.7 oz)., PRESENT ON ADMISSION            Disposition Plan      Expected Discharge Date: 01/17/2024                    Armando Gonzalez DO  Hospitalist Service  [facility]  Securely message with [employer] (more info)  Text page via [employer] Paging/Directory   ______________________________________________________________________    Interval History   NAEO. 01/16 was  requiring 4-5L O2 NC, down to 1L O2 NC today (desats to 86-88% on RA). Expect discharge without O2 needs 01/17.    Physical Exam   Vital Signs: Temp: 97.5  F (36.4  C) Temp src: Oral BP: 116/79 Pulse: 67   Resp: 18 SpO2: 94 % O2 Device: Nasal cannula Oxygen Delivery: 1 LPM  Weight: 158 lbs 11.7 oz    Physical Exam  Constitutional:       Appearance: She is not toxic-appearing.   HENT:      Head: Normocephalic and atraumatic.      Nose: Nose normal.   Eyes:      General: No scleral icterus.     Conjunctiva/sclera: Conjunctivae normal.   Pulmonary:      Effort: Pulmonary effort is normal.      Breath sounds: Rhonchi (bibasilar) present. No wheezing.   Abdominal:      General: Abdomen is flat.   Skin:     General: Skin is dry.      Findings: No rash.   Neurological:      General: No focal deficit present.      Mental Status: She is alert.   Psychiatric:         Mood and Affect: Mood normal.         Behavior: Behavior normal.           Medical Decision Making       35 MINUTES SPENT BY ME on the date of service doing chart review, history, exam, documentation & further activities per the note.      Data     I have personally reviewed the following data over the past 24 hrs:    N/A  \   N/A   / 183     N/A N/A N/A /  N/A   N/A N/A 0.91 \       Imaging results reviewed over the past 24 hrs:   No results found for this or any previous visit (from the past 24 hour(s)).  "

## 2024-01-16 NOTE — PLAN OF CARE
Problem: Adult Inpatient Plan of Care  Goal: Optimal Comfort and Wellbeing  Outcome: Progressing     Problem: Adult Inpatient Plan of Care  Goal: Readiness for Transition of Care  Outcome: No change   Goal Outcome Evaluation:      Plan of Care Reviewed With: patient    Overall Patient Progress: no changeOverall Patient Progress: no change    Patient up independently in room, attempted RA though SpO2 dipped down to 88-89% (and was found to be 85% when patient removed the oxygen herself) so remains on 1 lpm, continues to have cough, cough syrup PRN, denied pain, patient reported that breathing hasn't improved, able to make needs known

## 2024-01-16 NOTE — PROGRESS NOTES
Patient is alert and orientated, she will let her needs be known. She is up independently in her room. She is currently on 1L of oxygen via Nasal Cannula/Humidification sats are at 95%. She has a PIV that is saline locked in the right. She has a cough that is congested, cough syrup was given per the MAR but patient says that it has not helped so a sticky note was left for chloraseptic lozenges as patient stated she has the cough syrup at home and that helps with her cough.

## 2024-01-17 VITALS
BODY MASS INDEX: 26.45 KG/M2 | RESPIRATION RATE: 16 BRPM | HEART RATE: 59 BPM | OXYGEN SATURATION: 90 % | WEIGHT: 158.73 LBS | HEIGHT: 65 IN | DIASTOLIC BLOOD PRESSURE: 66 MMHG | TEMPERATURE: 97.3 F | SYSTOLIC BLOOD PRESSURE: 102 MMHG

## 2024-01-17 PROCEDURE — 99239 HOSP IP/OBS DSCHRG MGMT >30: CPT | Performed by: STUDENT IN AN ORGANIZED HEALTH CARE EDUCATION/TRAINING PROGRAM

## 2024-01-17 PROCEDURE — 94640 AIRWAY INHALATION TREATMENT: CPT

## 2024-01-17 PROCEDURE — 250N000013 HC RX MED GY IP 250 OP 250 PS 637

## 2024-01-17 PROCEDURE — 250N000013 HC RX MED GY IP 250 OP 250 PS 637: Performed by: STUDENT IN AN ORGANIZED HEALTH CARE EDUCATION/TRAINING PROGRAM

## 2024-01-17 PROCEDURE — 250N000009 HC RX 250: Performed by: STUDENT IN AN ORGANIZED HEALTH CARE EDUCATION/TRAINING PROGRAM

## 2024-01-17 RX ORDER — GUAIFENESIN/DEXTROMETHORPHAN 100-10MG/5
10 SYRUP ORAL EVERY 4 HOURS PRN
Qty: 100 ML | Refills: 0 | Status: SHIPPED | OUTPATIENT
Start: 2024-01-17

## 2024-01-17 RX ORDER — IPRATROPIUM BROMIDE AND ALBUTEROL SULFATE 2.5; .5 MG/3ML; MG/3ML
3 SOLUTION RESPIRATORY (INHALATION)
Status: DISCONTINUED | OUTPATIENT
Start: 2024-01-17 | End: 2024-01-17 | Stop reason: HOSPADM

## 2024-01-17 RX ORDER — BENZONATATE 100 MG/1
100 CAPSULE ORAL 3 TIMES DAILY PRN
Qty: 15 CAPSULE | Refills: 0 | Status: SHIPPED | OUTPATIENT
Start: 2024-01-17 | End: 2024-01-22

## 2024-01-17 RX ADMIN — AMLODIPINE BESYLATE 10 MG: 10 TABLET ORAL at 08:45

## 2024-01-17 RX ADMIN — FUROSEMIDE 30 MG: 20 TABLET ORAL at 08:45

## 2024-01-17 RX ADMIN — IPRATROPIUM BROMIDE AND ALBUTEROL SULFATE 3 ML: 2.5; .5 SOLUTION RESPIRATORY (INHALATION) at 11:30

## 2024-01-17 RX ADMIN — LISINOPRIL 40 MG: 40 TABLET ORAL at 08:45

## 2024-01-17 RX ADMIN — ATENOLOL 50 MG: 50 TABLET ORAL at 08:45

## 2024-01-17 ASSESSMENT — ACTIVITIES OF DAILY LIVING (ADL)
ADLS_ACUITY_SCORE: 24

## 2024-01-17 NOTE — DISCHARGE SUMMARY
"[facility]  Hospitalist Discharge Summary      Date of Admission:  1/12/2024  Date of Discharge:  1/17/2024  Discharging Provider: Armando Gonzalez DO  Discharge Service: Hospitalist Service    Discharge Diagnoses   Claudene Y Lundberg is a 83 year old female with past medical history of HTN, HLD, and CKD who presented to the hospital with SOB and was admitted with hypoxia suspect secondary to RSV infection. Hypoxia resolved and patient discharged home on room air.    Acute respiratory failure with hypoxia and hypercapnia  RSV infection  First noted to have hypoxia of 82% in urgent care and requiring up to 4 liters O2 via NC. No fever or leukocytosis. Tested positive for RSV. Negative COVID and influenza. VBG without acidosis but notable for hypercapnia (59). CXR no evidence of acute airspace disease.   Initially started on prednisone but not continued as no apparent COPD or asthma.    Resolved    Hypertension    Chronic kidney disease, stage 3      Clinically Significant Risk Factors     # Overweight: Estimated body mass index is 26.41 kg/m  as calculated from the following:    Height as of this encounter: 1.651 m (5' 5\").    Weight as of this encounter: 72 kg (158 lb 11.7 oz).       Follow-ups Needed After Discharge   Follow-up Appointments     Follow-up and recommended labs and tests       Follow up with primary care provider, Karis Colin, within 7 days for   hospital follow- up.  No follow up labs or test are needed.            Unresulted Labs Ordered in the Past 30 Days of this Admission       No orders found from 12/13/2023 to 1/13/2024.        These results will be followed up by N/A    Discharge Disposition   Discharged to home  Condition at discharge: Fair    Hospital Course   Claudene Y Lundberg is a 83 year old female with past medical history of HTN, HLD, and CKD who presented to the hospital with SOB and was admitted with hypoxia suspect secondary to RSV infection. Continued " hypoxia and need for 5L NC prolonging hospitalization.     Acute respiratory failure with hypoxia and hypercapnia  RSV infection  First noted to have hypoxia of 82% in urgent care and requiring up to 4 liters O2 via NC. No fever or leukocytosis. Tested positive for RSV. Negative COVID and influenza. VBG without acidosis but notable for hypercapnia (59). CXR no evidence of acute airspace disease.   Initially started on prednisone but not continued as no apparent COPD or asthma. Is wheezy 01/16 but will start nebs scheduled to help. Encouraged use of IS.    - added IS and SCHEDULED nebs  - Supplemental oxygen as needed to keep SpO2 >90  - Duoneb available PRN after scheduled stops  - Benzonatate and guaifenesin-dextromethorphan available for cough     Hypertension    Chronic. Blood pressures reviewed, and hypertensive at presentation which has since improved. HTN managed PTA with furosemide 30 mg , atenolol 50 mg, amlodipine 10 mg, and lisinopril 40 mg.    - Restarted PTA  furosemide, amlodipine, and lisinopril with some hold parameters  - Continued PTA atenolol    Chronic kidney disease, stage 3    Creatine at admission 0.91 with baseline near 0.75 - 0.90. Appears stable at time of admission.  - Monitor with daily BMP    Consultations This Hospital Stay   None    Code Status   No CPR- Do NOT Intubate    Time Spent on this Encounter   I, Armando Gonzalez DO, personally saw the patient today and spent greater than 30 minutes discharging this patient.       Armando Gonzalez DO  [facility] MEDICAL SURGICAL  [address] [address]  Phone: [phone]  Fax: [phone]  ______________________________________________________________________    Physical Exam   Vital Signs: Temp: 97.3  F (36.3  C) Temp src: Oral BP: 90/61 Pulse: 59   Resp: 16 SpO2: 92 % O2 Device: Nasal cannula with humidification Oxygen Delivery: 2 LPM  Weight: 158 lbs 11.7 oz  Physical Exam  Constitutional:       General: Pt is not  in acute distress. She has good SpO2 on room air.   HENT:      Head: Normocephalic and atraumatic.      Nose: Nose normal.   Eyes:      Conjunctiva/sclera: Conjunctivae normal.   Pulmonary:      Effort: Pulmonary effort is normal.   Abdominal:      General: Abdomen is flat.   Skin:     Findings: No rash.   Neurological:      General: No focal deficit present.      Mental Status: She is alert.   Psychiatric:         Mood and Affect: Mood normal.          Primary Care Physician   Karis Colin    Discharge Orders      Reason for your hospital stay    You were hospitalized due to breathing problems from a viral infection. After improvement in your breathing you were discharged home.     Follow-up and recommended labs and tests     Follow up with primary care provider, Karis Colin, within 7 days for hospital follow- up.  No follow up labs or test are needed.     Activity    Your activity upon discharge: activity as tolerated     Incentive Spirometry    Continue to use incentive spirometer 8 times a day for the next 5 days after leaving the hospital     Diet    Follow this diet upon discharge: Orders Placed This Encounter      Regular Diet Adult       Significant Results and Procedures   Results for orders placed or performed during the hospital encounter of 01/12/24   Chest XR,  PA & LAT    Narrative    CHEST TWO VIEWS  1/12/2024 1:01 PM     HISTORY: Shortness of breath and cough.    COMPARISON: 4/11/2023.      Impression    IMPRESSION: Large hiatal hernia appears increased in distention. No  acute airspace disease. Normal cardiac silhouette.    JESSICA ARRINGTON MD         SYSTEM ID:  [id number]       Discharge Medications   Current Discharge Medication List        START taking these medications    Details   benzocaine-menthol (CHLORASEPTIC) 6-10 MG lozenge Place 1 lozenge inside cheek every hour as needed for sore throat  Qty: 20 lozenge, Refills: 0    Associated Diagnoses: Cough, unspecified type; Pneumonia of lower  lobe due to infectious organism, unspecified laterality      benzonatate (TESSALON) 100 MG capsule Take 1 capsule (100 mg) by mouth 3 times daily as needed for cough  Qty: 15 capsule, Refills: 0    Associated Diagnoses: Cough, unspecified type; Pneumonia of lower lobe due to infectious organism, unspecified laterality      guaiFENesin-dextromethorphan (ROBITUSSIN DM) 100-10 MG/5ML syrup Take 10 mLs by mouth every 4 hours as needed for cough  Qty: 100 mL, Refills: 0    Associated Diagnoses: Cough, unspecified type; Pneumonia of lower lobe due to infectious organism, unspecified laterality           CONTINUE these medications which have NOT CHANGED    Details   amLODIPine (NORVASC) 10 MG tablet Take 1 tablet by mouth daily      atenolol (TENORMIN) 50 MG tablet Take 50 mg by mouth daily      furosemide (LASIX) 20 MG tablet Take 30 mg by mouth daily      lisinopril (ZESTRIL) 40 MG tablet Take 40 mg by mouth daily           Allergies   No Known Allergies

## 2024-01-17 NOTE — PROGRESS NOTES
Home oxygen assessment complete:  SpO2 room Air: 95%  SpO2 Room Air with Activity: 90-95%.       Patient did have 80% on room air for a second but was moving device on finger- next minute she was at 96% on room air still. Maintaining 91%. Denies shortness of breath.

## 2024-01-17 NOTE — PROGRESS NOTES
[facility] DISCHARGE NOTE    Patient discharged to home at 2:37 PM via wheel chair. Accompanied by other:self/staff and staff. Discharge instructions reviewed with patient, opportunity offered to ask questions. Prescriptions sent to patients preferred pharmacy. All belongings sent with patient.    Madina Barrera RN

## 2024-01-17 NOTE — PLAN OF CARE
Goal Outcome Evaluation:    Plan of Care Reviewed With: patient    Overall Patient Progress: no change  Overall Patient Progress: no change    Outcome Evaluation: Patient alert and oriented. Vitals stable; currently requiring 2 LPM O2 via nasal cannula to keep sats > 90%. Denies pain. Independent in the room. Congested cough increasingly productive throughout this shift, producing thick, clear sputum. She declined PRN cough medications.    Problem: Gas Exchange Impaired  Goal: Optimal Gas Exchange  Outcome: Not Progressing     Problem: Risk for Delirium  Goal: Improved Behavioral Control  Outcome: Progressing  Intervention: Minimize Safety Risk  Recent Flowsheet Documentation  Taken 1/16/2024 1727 by Rosibel Rubin RN  Enhanced Safety Measures: room near unit station  Goal: Improved Attention and Thought Clarity  Outcome: Progressing  Goal: Improved Sleep  Outcome: Progressing    Problem: Adult Inpatient Plan of Care  Goal: Absence of Hospital-Acquired Illness or Injury  Outcome: Progressing  Intervention: Identify and Manage Fall Risk  Recent Flowsheet Documentation  Taken 1/16/2024 1727 by Rosibel Rubin RN  Safety Promotion/Fall Prevention:   assistive device/personal items within reach   clutter free environment maintained   lighting adjusted   nonskid shoes/slippers when out of bed   room near nurse's station   safety round/check completed  Intervention: Prevent and Manage VTE (Venous Thromboembolism) Risk  Recent Flowsheet Documentation  Taken 1/16/2024 1727 by Rosibel Rubin RN  VTE Prevention/Management: patient refused intervention

## 2024-01-18 ENCOUNTER — PATIENT OUTREACH (OUTPATIENT)
Dept: CARE COORDINATION | Facility: CLINIC | Age: 84
End: 2024-01-18
Payer: COMMERCIAL

## 2024-01-18 NOTE — PROGRESS NOTES
"Clinic Care Coordination Contact  [facility]: Post-Discharge Note  SITUATION                                                      Admission:    Admission Date: 01/12/24   Reason for Admission: Cough, Shortness of breath  Discharge:   Discharge Date: 01/17/24  Discharge Diagnosis: Acute respiratory failure with hypoxia and hypercapnia, RSV infection    BACKGROUND                                                      Per hospital discharge summary and inpatient provider notes:    Claudene Y Lundberg is a 83 year old female who presents for evaluation with cough shortness of breath and concern about pneumonia.     Patient's medical records show history of hypertension high cholesterol and stage III chronic kidney disease.  Patient's current prescribed medications were reviewed.     Patient was captured to be hypoxic in urgent care 82% on room air and referred to the emergency department for further care     On examination patient reports she has heard a rattle in her chest and has been coughing/short of breath for a few days.  She is not typically oxygen dependent.  She is on furosemide, atenolol, amlodipine, and lisinopril.  She has been compliant with medications.  She reports no chills no orthopnea no dyspnea on exertion no new lower extremity swelling.      ASSESSMENT      Discharge Assessment  How are you doing now that you are home?: \"Oh I'm hanging in there. I took a bath and am just taking it easy. I was in there for 6 days so I'm tired and weak but I'm doing okay. I took the cough syrup last night and I took some again this morning.\"  How are your symptoms? (Red Flag symptoms escalate to triage hotline per guidelines): Improved;Unchanged  Do you feel your condition is stable enough to be safe at home until your provider visit?: Yes  Does the patient have their discharge instructions? : Yes  Does the patient have questions regarding their discharge instructions? : No  Were you started on any new " medications or were there changes to any of your previous medications? : Yes  Does the patient have all of their medications?: Yes  Do you have questions regarding any of your medications? : No  Do you have all of your needed medical supplies or equipment (DME)?  (i.e. oxygen tank, CPAP, cane, etc.): Yes  Discharge follow-up appointment scheduled within 14 calendar days? : Yes  Discharge Follow Up Appointment Date: 01/24/24  Discharge Follow Up Appointment Scheduled with?: Primary Care Provider    Post-op (SUZIE CTA Only)  If the patient had a surgery or procedure, do they have any questions for a nurse?: No    PLAN                                                      Outpatient Plan:     Follow-ups Needed After Discharge  Follow-up Appointments     Follow-up and recommended labs and tests       Follow up with primary care provider, Karis Colin, within 7 days for   hospital follow- up.  No follow up labs or test are needed.         No future appointments.      For any urgent concerns, please contact our 24 hour nurse triage line: [phone] ([phone])         SUZIE Perez  [phone]  [facility]